# Patient Record
Sex: FEMALE | Race: BLACK OR AFRICAN AMERICAN | Employment: OTHER
[De-identification: names, ages, dates, MRNs, and addresses within clinical notes are randomized per-mention and may not be internally consistent; named-entity substitution may affect disease eponyms.]

---

## 2022-09-09 ENCOUNTER — OFFICE VISIT (OUTPATIENT)
Dept: FAMILY MEDICINE CLINIC | Facility: CLINIC | Age: 67
End: 2022-09-09
Payer: COMMERCIAL

## 2022-09-09 VITALS
BODY MASS INDEX: 15.61 KG/M2 | WEIGHT: 103 LBS | HEIGHT: 68 IN | OXYGEN SATURATION: 97 % | HEART RATE: 66 BPM | DIASTOLIC BLOOD PRESSURE: 85 MMHG | SYSTOLIC BLOOD PRESSURE: 138 MMHG

## 2022-09-09 DIAGNOSIS — I25.10 CORONARY ARTERY DISEASE INVOLVING NATIVE CORONARY ARTERY OF NATIVE HEART WITHOUT ANGINA PECTORIS: Primary | ICD-10-CM

## 2022-09-09 DIAGNOSIS — R73.03 PRE-DIABETES: ICD-10-CM

## 2022-09-09 DIAGNOSIS — Z86.19 HISTORY OF HEPATITIS C: ICD-10-CM

## 2022-09-09 DIAGNOSIS — Z00.00 ENCOUNTER FOR MEDICAL EXAMINATION TO ESTABLISH CARE: ICD-10-CM

## 2022-09-09 DIAGNOSIS — Z95.5 HISTORY OF HEART ARTERY STENT: ICD-10-CM

## 2022-09-09 DIAGNOSIS — E78.5 DYSLIPIDEMIA: ICD-10-CM

## 2022-09-09 LAB
BASOPHILS # BLD: 0 K/UL (ref 0–0.2)
BASOPHILS NFR BLD: 1 % (ref 0–2)
DIFFERENTIAL METHOD BLD: ABNORMAL
EOSINOPHIL # BLD: 0.1 K/UL (ref 0–0.8)
EOSINOPHIL NFR BLD: 2 % (ref 0.5–7.8)
ERYTHROCYTE [DISTWIDTH] IN BLOOD BY AUTOMATED COUNT: 14.6 % (ref 11.9–14.6)
HCT VFR BLD AUTO: 36.8 % (ref 35.8–46.3)
HGB BLD-MCNC: 11.5 G/DL (ref 11.7–15.4)
IMM GRANULOCYTES # BLD AUTO: 0 K/UL (ref 0–0.5)
IMM GRANULOCYTES NFR BLD AUTO: 0 % (ref 0–5)
LYMPHOCYTES # BLD: 2.9 K/UL (ref 0.5–4.6)
LYMPHOCYTES NFR BLD: 62 % (ref 13–44)
MCH RBC QN AUTO: 27.6 PG (ref 26.1–32.9)
MCHC RBC AUTO-ENTMCNC: 31.3 G/DL (ref 31.4–35)
MCV RBC AUTO: 88.5 FL (ref 79.6–97.8)
MONOCYTES # BLD: 0.4 K/UL (ref 0.1–1.3)
MONOCYTES NFR BLD: 9 % (ref 4–12)
NEUTS SEG # BLD: 1.2 K/UL (ref 1.7–8.2)
NEUTS SEG NFR BLD: 26 % (ref 43–78)
NRBC # BLD: 0 K/UL (ref 0–0.2)
PLATELET # BLD AUTO: 243 K/UL (ref 150–450)
PMV BLD AUTO: 10.3 FL (ref 9.4–12.3)
RBC # BLD AUTO: 4.16 M/UL (ref 4.05–5.2)
WBC # BLD AUTO: 4.5 K/UL (ref 4.3–11.1)

## 2022-09-09 PROCEDURE — 1123F ACP DISCUSS/DSCN MKR DOCD: CPT | Performed by: FAMILY MEDICINE

## 2022-09-09 PROCEDURE — 99204 OFFICE O/P NEW MOD 45 MIN: CPT | Performed by: FAMILY MEDICINE

## 2022-09-09 RX ORDER — ATORVASTATIN CALCIUM 80 MG/1
80 TABLET, FILM COATED ORAL
COMMUNITY
Start: 2022-01-31

## 2022-09-09 RX ORDER — LISINOPRIL 2.5 MG/1
2.5 TABLET ORAL DAILY
COMMUNITY
Start: 2022-01-31 | End: 2023-01-26

## 2022-09-09 RX ORDER — CLOPIDOGREL BISULFATE 75 MG/1
75 TABLET ORAL DAILY
COMMUNITY
Start: 2022-01-31 | End: 2023-03-03

## 2022-09-09 RX ORDER — CARVEDILOL 6.25 MG/1
6.25 TABLET ORAL 2 TIMES DAILY WITH MEALS
COMMUNITY
Start: 2022-01-31

## 2022-09-09 ASSESSMENT — PATIENT HEALTH QUESTIONNAIRE - PHQ9
SUM OF ALL RESPONSES TO PHQ9 QUESTIONS 1 & 2: 0
SUM OF ALL RESPONSES TO PHQ QUESTIONS 1-9: 0
SUM OF ALL RESPONSES TO PHQ QUESTIONS 1-9: 0
2. FEELING DOWN, DEPRESSED OR HOPELESS: 0
SUM OF ALL RESPONSES TO PHQ QUESTIONS 1-9: 0
SUM OF ALL RESPONSES TO PHQ QUESTIONS 1-9: 0
1. LITTLE INTEREST OR PLEASURE IN DOING THINGS: 0

## 2022-09-09 ASSESSMENT — ENCOUNTER SYMPTOMS
EYE REDNESS: 0
NAUSEA: 0
SORE THROAT: 0
DIARRHEA: 0
CONSTIPATION: 0
FACIAL SWELLING: 0
SINUS PRESSURE: 0
STRIDOR: 0
BACK PAIN: 0
ABDOMINAL DISTENTION: 0
WHEEZING: 0
VOMITING: 0
COUGH: 0
SINUS PAIN: 0
CHEST TIGHTNESS: 0
COLOR CHANGE: 0
RHINORRHEA: 0
EYE DISCHARGE: 0
SHORTNESS OF BREATH: 0
EYE ITCHING: 0
EYE PAIN: 0
ABDOMINAL PAIN: 0
BLOOD IN STOOL: 0

## 2022-09-09 NOTE — PROGRESS NOTES
[address] [place] [address]  _______________________________________  Anthony Jameson MD                 [address] [place] [address], [address]. [place], [address] [place] [address]                     Jerad Fagan 2                                                                                    Phone: [phone]                                                                                    Fax: [phone]    Pernell Jacobson is a 74 y.o. female who is seen for evaluation of   Chief Complaint   Patient presents with    Establish Care     Pt hasn't had a PCP in several years. She wishes to establish care and have a physical.    Hepatitis C     Pt was found to have hep c about 5 years ago and hasn't had treatment yet because she continues to use marijuana. HPI:   Slim Fletcher is a 75 y/o F with h/o CAD, s/p MI with PCI, chronic hep C, pre-diabetes, dyslipidemia, polysubstance abuse, here to establish care. She has previously only seen [address] [place] [address] for PCP. - CAD- ST elevation MI 12/16/2021. mid LAD with successful PCI and 2 overlapping drug-eluting stents. Follow-up echo the next day showed LV systolic dysfunction with an EF of 35 to 40%. Anterior, anterior septal, and apical akinesis. Recommended dual antiplatelet x 12 months, ASA for life, high intensity statin, and ACE inhibitor and beta-blocker. She was due to see Jazlyn Aguirre MD (cardiology) in 6/2022.     - dyslipidemia- Last LDL in 2021 was 110. Pt is on lipitor 80 mg, but denies any compliance with dietary modifications. - hep C- Pt states that she was diagnosed at [address] [place] [address] a number of years ago, but never saw GI.    - Pre-diabetes- Pt denies any limitations to diet. Review of Systems:  Review of Systems   Constitutional:  Negative for activity change, appetite change, chills, diaphoresis, fatigue, fever and unexpected weight change.    HENT:  Negative for congestion, ear discharge, ear pain, facial swelling, hearing loss, mouth sores, nosebleeds, postnasal drip, rhinorrhea, sinus pressure, sinus pain, sore throat and tinnitus. Eyes:  Negative for pain, discharge, redness, itching and visual disturbance. Respiratory:  Negative for cough, chest tightness, shortness of breath, wheezing and stridor. Cardiovascular:  Negative for chest pain, palpitations and leg swelling. Gastrointestinal:  Negative for abdominal distention, abdominal pain, blood in stool, constipation, diarrhea, nausea and vomiting. Endocrine: Negative for cold intolerance, heat intolerance, polydipsia, polyphagia and polyuria. Genitourinary:  Negative for decreased urine volume, difficulty urinating, dysuria, flank pain, frequency, hematuria and urgency. Musculoskeletal:  Negative for arthralgias, back pain, gait problem, joint swelling, myalgias and neck pain. Skin:  Negative for color change, pallor, rash and wound. Neurological:  Negative for dizziness, tremors, seizures, syncope, facial asymmetry, speech difficulty, weakness, light-headedness, numbness and headaches. Psychiatric/Behavioral:  Negative for agitation, behavioral problems, confusion, hallucinations, self-injury, sleep disturbance and suicidal ideas. The patient is not nervous/anxious.        History:  Past Medical History:   Diagnosis Date    CAD (coronary artery disease)     Hyperlipidemia        Past Surgical History:   Procedure Laterality Date    FACIAL FRACTURE SURGERY  1980       Family History   Problem Relation Age of Onset    Diabetes Mother     Heart Disease Father     Cancer Father        Social History     Tobacco Use    Smoking status: Every Day     Packs/day: 0.50     Types: Cigarettes     Start date: 1970    Smokeless tobacco: Never   Substance Use Topics    Alcohol use: Yes     Comment: socially       No Known Allergies    Current Outpatient Medications   Medication Sig Dispense Refill    atorvastatin (LIPITOR) 80 MG tablet Take 80 mg by mouth      carvedilol (COREG) 6.25 MG tablet Take 6.25 mg by mouth 2 times daily (with meals)      clopidogrel (PLAVIX) 75 MG tablet Take 75 mg by mouth daily      lisinopril (PRINIVIL;ZESTRIL) 2.5 MG tablet Take 2.5 mg by mouth daily       No current facility-administered medications for this visit. Vitals:    /85 (Site: Left Upper Arm, Position: Sitting, Cuff Size: Small Adult)   Pulse 66   Ht 5' 8\" (1.727 m)   Wt 103 lb (46.7 kg)   SpO2 97%   BMI 15.66 kg/m²     Contains abnormal data Lipid Panel  Order: [id number]   Ref Range & Units 12/17/21 0535   Triglycerides <150 mg/dL 95    Cholesterol <200 mg/dL 204 High     HDL Cholesterol >59 mg/dL 75    LDL, Calculated <130 mg/dL 110    Comment: Desirable: < 130 mg/dL; Borderline 130 - 159 mg/dL   VLDL Cholesterol <40 mg/dL 19    Chol/HDL Ratio <3.5 2.7    Non-HDL Cholesterol <130 mg/dL 129      Basic Metabolic Panel  Order: [id number]   suggestion  Information displayed in this report may not trend or trigger automated decision support. Ref Range & Units 8 mo ago   Sodium 136 - 145 mmol/L 129 Low     Potassium 3.5 - 5.1 mmol/L 4.5    Chloride 98 - 107 mmol/L 96 Low     CO2 20 - 30 mmol/L 24    BUN 7 - 20 mg/dL 14    Calcium 8.5 - 10.4 mg/dL 8.7    Creatinine 0.57 - 1.11 mg/dL 0.82    Glucose 70 - 99 mg/dL 156 High     Anion Gap 6 - 16 mmol/L 9    eGFR >59 mL/min/1.73 m2 76    eGFR African American >59 mL/min/1.73 m2 87      Contains abnormal data Hgb A1C (Glycohemoglobin)  Order: [id number]   Ref Range & Units 12/17/21 0535   Hemoglobin A1C <5.7 % 6.2 High     Mean Bld Glu Estim.  Refer to Glucose mg/dL 131    [id number] [facility]   Narrative  Performed by [address] [place] [address]  5.7 - 6.4% At Risk for Diabetes Mellitus     Contains abnormal data Hepatitis Acute Panel w/Rflx  Order: [id number]   Ref Range & Units 12/16/21 0749   Hep B Surf Antigen [facility] Non-Reactive    Comment: Considered negative for HBsAg   Hep A Antibody, IgM Non-Reactive Non-Reactive    Comment: IgM anti-HAV not detected. Does not exclude the possibility of exposure to or infection with HAV. Levels of IgM anti-HAV may be below the cut-off in early infection. Hep B Core Ab, IgM Non-Reactive Non-Reactive    Comment: IgM anti-HBc not detected. Does not exclude the possibility of exposure to or infections with HBV. Hep C Antibody Non-Reactive REACTIVE, see results of PCR test Abnormal     Comment: Presumptive evidence of antibodies to HCV. Confirmatory testing by PCR to be performed. [id number] [facility]   Specimen Collected: 12/16/21 07:49 Last Resulted: 12/16/21 11:03   Received From: [facility]   Result Received: 09/09/22 10:49     Hepatitis C Genotype  Order: [id number]  Component 12/16/21 0737   Hep C Genotype  1a      Physical Exam:  Physical Exam  Vitals reviewed. Constitutional:       General: She is not in acute distress. Appearance: Normal appearance. She is not ill-appearing, toxic-appearing or diaphoretic. HENT:      Head: Normocephalic and atraumatic. Right Ear: Tympanic membrane, ear canal and external ear normal. There is no impacted cerumen. Left Ear: Tympanic membrane, ear canal and external ear normal. There is no impacted cerumen. Nose: Nose normal. No congestion or rhinorrhea. Mouth/Throat:      Mouth: Mucous membranes are moist.      Pharynx: No oropharyngeal exudate or posterior oropharyngeal erythema. Eyes:      General: No scleral icterus. Right eye: No discharge. Left eye: No discharge. Extraocular Movements: Extraocular movements intact. Conjunctiva/sclera: Conjunctivae normal.      Pupils: Pupils are equal, round, and reactive to light. Cardiovascular:      Rate and Rhythm: Normal rate and regular rhythm. Pulses: Normal pulses. Heart sounds: Normal heart sounds. No murmur heard. No friction rub.  No gallop. Pulmonary:      Effort: Pulmonary effort is normal. No respiratory distress. Breath sounds: Normal breath sounds. No stridor. No wheezing, rhonchi or rales. Chest:      Chest wall: No tenderness. Abdominal:      General: Abdomen is flat. Bowel sounds are normal. There is no distension. Palpations: Abdomen is soft. There is no mass. Tenderness: There is no abdominal tenderness. There is no right CVA tenderness, left CVA tenderness, guarding or rebound. Musculoskeletal:         General: No swelling, tenderness, deformity or signs of injury. Cervical back: Neck supple. No rigidity or tenderness. Right lower leg: No edema. Left lower leg: No edema. Lymphadenopathy:      Cervical: No cervical adenopathy. Skin:     General: Skin is warm and dry. Coloration: Skin is not jaundiced or pale. Findings: No bruising, erythema, lesion or rash. Neurological:      General: No focal deficit present. Mental Status: She is alert. Mental status is at baseline. Motor: No weakness. Coordination: Coordination normal.      Gait: Gait normal.   Psychiatric:         Mood and Affect: Mood normal.         Behavior: Behavior normal.         Thought Content: Thought content normal.         Judgment: Judgment normal.       Assessment/Plan:     ICD-10-CM    1. Coronary artery disease involving native coronary artery of native heart without angina pectoris  I25.10 CBC with Auto Differential     Basic Metabolic Panel     Basic Metabolic Panel     CBC with Auto Differential      2. Dyslipidemia  E78.5 Lipid Panel     Lipid Panel      3. History of heart artery stent  Z95.5 Lipid Panel     Lipid Panel      4. Pre-diabetes  R73.03 Hemoglobin A1C     Hemoglobin A1C      5. History of hepatitis C  Z86.19 Hepatic Function Panel     Hepatic Function Panel      6.  Encounter for medical examination to establish care  Z00.00 TSH     TSH           Problem List          Circulatory Coronary artery disease involving native coronary artery of native heart without angina pectoris - Primary      Stable. Continue statin, BB, plavix, ACEi. Encouraged abstinence for illicit substances. Relevant Medications    atorvastatin (LIPITOR) 80 MG tablet    carvedilol (COREG) 6.25 MG tablet    lisinopril (PRINIVIL;ZESTRIL) 2.5 MG tablet    Other Relevant Orders    CBC with Auto Differential    Basic Metabolic Panel       Digestive    History of hepatitis C    Relevant Orders    Hepatic Function Panel       Other    Dyslipidemia     LDL goal < 70. Continue statin. Encouraged to reduce red meat, fried foods, fast foods, butter, mayonnaise and dairy products; increase daily exercise and limit intake of egg yolks to < 7 egg yolks weekly. Relevant Orders    Lipid Panel    History of heart artery stent      Medical optimization. Will follow with cards. Relevant Orders    Lipid Panel    Pre-diabetes     F/u a1c. Encouraged to control intake of sugar, bread, pasta, rice, potatoes, fruit, snack foods, desserts in diet. Relevant Orders    Hemoglobin A1C      I spent 47 minutes preparing to see patient (including chart review and preparation), obtaining and/or reviewing additional medical history, performing a physical exam and evaluation, documenting clinical information in the electronic health record, independently interpreting results, communicating results to patient, family or caregiver, and/or coordinating care.     Louis Rodriguez MD

## 2022-09-09 NOTE — ASSESSMENT & PLAN NOTE
LDL goal < 70. Continue statin. Encouraged to reduce red meat, fried foods, fast foods, butter, mayonnaise and dairy products; increase daily exercise and limit intake of egg yolks to < 7 egg yolks weekly.

## 2022-09-09 NOTE — ASSESSMENT & PLAN NOTE
F/u a1c. Encouraged to control intake of sugar, bread, pasta, rice, potatoes, fruit, snack foods, desserts in diet.

## 2022-09-10 LAB
ALBUMIN SERPL-MCNC: 3.5 G/DL (ref 3.2–4.6)
ALBUMIN/GLOB SERPL: 1 {RATIO} (ref 1.2–3.5)
ALP SERPL-CCNC: 137 U/L (ref 50–136)
ALT SERPL-CCNC: 33 U/L (ref 12–65)
ANION GAP SERPL CALC-SCNC: 8 MMOL/L (ref 4–13)
AST SERPL-CCNC: 30 U/L (ref 15–37)
BILIRUB DIRECT SERPL-MCNC: 0.1 MG/DL
BILIRUB SERPL-MCNC: 0.3 MG/DL (ref 0.2–1.1)
BUN SERPL-MCNC: 14 MG/DL (ref 8–23)
CALCIUM SERPL-MCNC: 8.9 MG/DL (ref 8.3–10.4)
CHLORIDE SERPL-SCNC: 113 MMOL/L (ref 101–110)
CHOLEST SERPL-MCNC: 122 MG/DL
CO2 SERPL-SCNC: 25 MMOL/L (ref 21–32)
CREAT SERPL-MCNC: 0.9 MG/DL (ref 0.6–1)
EST. AVERAGE GLUCOSE BLD GHB EST-MCNC: 146 MG/DL
GLOBULIN SER CALC-MCNC: 3.5 G/DL (ref 2.3–3.5)
GLUCOSE SERPL-MCNC: 140 MG/DL (ref 65–100)
HBA1C MFR BLD: 6.7 % (ref 4.8–5.6)
HDLC SERPL-MCNC: 62 MG/DL (ref 40–60)
HDLC SERPL: 2 {RATIO}
LDLC SERPL CALC-MCNC: 39.6 MG/DL
POTASSIUM SERPL-SCNC: 4.2 MMOL/L (ref 3.5–5.1)
PROT SERPL-MCNC: 7 G/DL (ref 6.3–8.2)
SODIUM SERPL-SCNC: 146 MMOL/L (ref 136–145)
TRIGL SERPL-MCNC: 102 MG/DL (ref 35–150)
TSH, 3RD GENERATION: 0.95 UIU/ML (ref 0.36–3.74)
VLDLC SERPL CALC-MCNC: 20.4 MG/DL (ref 6–23)

## 2022-09-27 ENCOUNTER — OFFICE VISIT (OUTPATIENT)
Dept: FAMILY MEDICINE CLINIC | Facility: CLINIC | Age: 67
End: 2022-09-27
Payer: COMMERCIAL

## 2022-09-27 VITALS
WEIGHT: 101 LBS | BODY MASS INDEX: 15.31 KG/M2 | HEART RATE: 70 BPM | DIASTOLIC BLOOD PRESSURE: 70 MMHG | OXYGEN SATURATION: 98 % | HEIGHT: 68 IN | SYSTOLIC BLOOD PRESSURE: 142 MMHG

## 2022-09-27 DIAGNOSIS — R05.8 PRODUCTIVE COUGH: ICD-10-CM

## 2022-09-27 DIAGNOSIS — Z12.31 ENCOUNTER FOR SCREENING MAMMOGRAM FOR MALIGNANT NEOPLASM OF BREAST: ICD-10-CM

## 2022-09-27 DIAGNOSIS — Z78.0 MENOPAUSE: ICD-10-CM

## 2022-09-27 DIAGNOSIS — J01.90 ACUTE BACTERIAL SINUSITIS: ICD-10-CM

## 2022-09-27 DIAGNOSIS — D64.9 NORMOCYTIC ANEMIA: ICD-10-CM

## 2022-09-27 DIAGNOSIS — R03.0 ELEVATED BLOOD PRESSURE READING: ICD-10-CM

## 2022-09-27 DIAGNOSIS — R73.03 PRE-DIABETES: ICD-10-CM

## 2022-09-27 DIAGNOSIS — Z28.39 BEHIND ON IMMUNIZATIONS: ICD-10-CM

## 2022-09-27 DIAGNOSIS — R73.09 ELEVATED HEMOGLOBIN A1C: ICD-10-CM

## 2022-09-27 DIAGNOSIS — Z00.00 WELCOME TO MEDICARE PREVENTIVE VISIT: Primary | ICD-10-CM

## 2022-09-27 DIAGNOSIS — F17.200 SMOKER: ICD-10-CM

## 2022-09-27 DIAGNOSIS — B96.89 ACUTE BACTERIAL SINUSITIS: ICD-10-CM

## 2022-09-27 DIAGNOSIS — Z12.11 SCREENING FOR COLON CANCER: ICD-10-CM

## 2022-09-27 PROCEDURE — 99214 OFFICE O/P EST MOD 30 MIN: CPT | Performed by: FAMILY MEDICINE

## 2022-09-27 PROCEDURE — G0402 INITIAL PREVENTIVE EXAM: HCPCS | Performed by: FAMILY MEDICINE

## 2022-09-27 PROCEDURE — 1123F ACP DISCUSS/DSCN MKR DOCD: CPT | Performed by: FAMILY MEDICINE

## 2022-09-27 RX ORDER — ZOSTER VACCINE RECOMBINANT, ADJUVANTED 50 MCG/0.5
0.5 KIT INTRAMUSCULAR SEE ADMIN INSTRUCTIONS
Qty: 0.5 ML | Refills: 0 | Status: SHIPPED | OUTPATIENT
Start: 2022-09-27 | End: 2022-10-25

## 2022-09-27 RX ORDER — AMOXICILLIN 875 MG/1
875 TABLET, COATED ORAL 2 TIMES DAILY
Qty: 14 TABLET | Refills: 0 | Status: SHIPPED | OUTPATIENT
Start: 2022-09-27 | End: 2022-10-04

## 2022-09-27 ASSESSMENT — LIFESTYLE VARIABLES
HOW OFTEN DO YOU HAVE A DRINK CONTAINING ALCOHOL: 2-4 TIMES A MONTH
HOW MANY STANDARD DRINKS CONTAINING ALCOHOL DO YOU HAVE ON A TYPICAL DAY: 1 OR 2
HOW MANY STANDARD DRINKS CONTAINING ALCOHOL DO YOU HAVE ON A TYPICAL DAY: 1 OR 2
HOW OFTEN DO YOU HAVE A DRINK CONTAINING ALCOHOL: 2-4 TIMES A MONTH

## 2022-09-27 ASSESSMENT — PATIENT HEALTH QUESTIONNAIRE - PHQ9
2. FEELING DOWN, DEPRESSED OR HOPELESS: 0
SUM OF ALL RESPONSES TO PHQ QUESTIONS 1-9: 0
1. LITTLE INTEREST OR PLEASURE IN DOING THINGS: 0
SUM OF ALL RESPONSES TO PHQ QUESTIONS 1-9: 0
SUM OF ALL RESPONSES TO PHQ QUESTIONS 1-9: 0
SUM OF ALL RESPONSES TO PHQ9 QUESTIONS 1 & 2: 0
SUM OF ALL RESPONSES TO PHQ QUESTIONS 1-9: 0

## 2022-09-27 NOTE — PROGRESS NOTES
[address] [place] [address]  _______________________________________  Simeon Nails MD                 [address] [place] [address],  [address]. [place], [address] [place] [address]                     Jerad Ortega 2                                                                                    Phone: [phone]                                                                                    Fax: [phone]    Ivan Landin is a 74 y.o. female who is seen for evaluation of No chief complaint on file. HPI:   PT is seen today for welcome to medicare. - c/o > 10 day h/o sinus pressure, purulent rhinorrhea, mild cough with yellow sputum at times. Denies fever chills, sob. Review of Systems:  Review of Systems   Constitutional:  Negative for activity change, appetite change, chills, diaphoresis, fatigue, fever and unexpected weight change. HENT:  Negative for congestion, ear discharge, ear pain, facial swelling, hearing loss, mouth sores, nosebleeds, postnasal drip, rhinorrhea, sinus pressure, sinus pain, sore throat and tinnitus. Eyes:  Negative for pain, discharge, redness, itching and visual disturbance. Respiratory:  Negative for cough, chest tightness, shortness of breath, wheezing and stridor. Cardiovascular:  Negative for chest pain, palpitations and leg swelling. Gastrointestinal:  Negative for abdominal distention, abdominal pain, blood in stool, constipation, diarrhea, nausea and vomiting. Endocrine: Negative for cold intolerance, heat intolerance, polydipsia, polyphagia and polyuria. Genitourinary:  Negative for decreased urine volume, difficulty urinating, dysuria, flank pain, frequency, hematuria and urgency. Musculoskeletal:  Negative for arthralgias, back pain, gait problem, joint swelling, myalgias and neck pain. Skin:  Negative for color change, pallor, rash and wound.    Neurological:  Negative for dizziness, tremors, seizures, syncope, facial asymmetry, speech difficulty, weakness, light-headedness, numbness and headaches. Psychiatric/Behavioral:  Negative for agitation, behavioral problems, confusion, hallucinations, self-injury, sleep disturbance and suicidal ideas. The patient is not nervous/anxious. History:  Past Medical History:   Diagnosis Date    CAD (coronary artery disease)     Hyperlipidemia        Past Surgical History:   Procedure Laterality Date    FACIAL FRACTURE SURGERY  1980       Family History   Problem Relation Age of Onset    Diabetes Mother     Heart Disease Father     Cancer Father        Social History     Tobacco Use    Smoking status: Every Day     Packs/day: 0.25     Years: 52.00     Pack years: 13.00     Types: Cigarettes     Start date: 1970    Smokeless tobacco: Never   Substance Use Topics    Alcohol use: Yes     Comment: socially       No Known Allergies    Current Outpatient Medications   Medication Sig Dispense Refill    pneumococcal 20-valent conjugat (PREVNAR) 0.5 ML ZAIN inj Inject 0.5 mLs into the muscle once for 1 dose 0.5 mL 0    zoster recombinant adjuvanted vaccine (SHINGRIX) 50 MCG/0.5ML SUSR injection Inject 0.5 mLs into the muscle See Admin Instructions 1 dose now and repeat in 2-6 months 0.5 mL 0    Tetanus-Diphth-Acell Pertussis (BOOSTRIX) 5-2.5-18.5 LF-MCG/0.5 injection Inject 0.5 mLs into the muscle once for 1 dose 1 each 0    atorvastatin (LIPITOR) 80 MG tablet Take 80 mg by mouth      carvedilol (COREG) 6.25 MG tablet Take 6.25 mg by mouth 2 times daily (with meals)      clopidogrel (PLAVIX) 75 MG tablet Take 75 mg by mouth daily      lisinopril (PRINIVIL;ZESTRIL) 2.5 MG tablet Take 2.5 mg by mouth daily       No current facility-administered medications for this visit.        Vitals:    BP (!) 170/90 (Site: Left Upper Arm, Position: Sitting, Cuff Size: Small Adult)   Pulse 70   Ht 5' 8\" (1.727 m)   Wt 101 lb (45.8 kg)   SpO2 98%   BMI 15.36 kg/m²     Lab Results   Component Value Date    WBC 4.5 09/09/2022    HGB 11.5 (L) 09/09/2022    HCT 36.8 09/09/2022    MCV 88.5 09/09/2022     09/09/2022     Lab Results   Component Value Date/Time     09/09/2022 11:39 AM    K 4.2 09/09/2022 11:39 AM     09/09/2022 11:39 AM    CO2 25 09/09/2022 11:39 AM    BUN 14 09/09/2022 11:39 AM    CREATININE 0.90 09/09/2022 11:39 AM    GLUCOSE 140 09/09/2022 11:39 AM    CALCIUM 8.9 09/09/2022 11:39 AM      Lab Results   Component Value Date    ALT 33 09/09/2022    AST 30 09/09/2022    ALKPHOS 137 (H) 09/09/2022    BILITOT 0.3 09/09/2022     Lab Results   Component Value Date    KEY7UJT 0.955 09/09/2022     Lab Results   Component Value Date    CHOL 122 09/09/2022     Lab Results   Component Value Date    TRIG 102 09/09/2022     Lab Results   Component Value Date    HDL 62 (H) 09/09/2022     Lab Results   Component Value Date    LDLCALC 39.6 09/09/2022     Lab Results   Component Value Date    LABVLDL 20.4 09/09/2022     Lab Results   Component Value Date    CHOLHDLRATIO 2.0 09/09/2022     Lab Results   Component Value Date    LABA1C 6.7 (H) 09/09/2022     Lab Results   Component Value Date     09/09/2022         Physical Exam:  Physical Exam  Vitals reviewed. Constitutional:       General: She is not in acute distress. Appearance: Normal appearance. She is not ill-appearing, toxic-appearing or diaphoretic. HENT:      Head: Normocephalic and atraumatic. Right Ear: Tympanic membrane, ear canal and external ear normal. There is no impacted cerumen. Left Ear: Tympanic membrane, ear canal and external ear normal. There is no impacted cerumen. Nose: Nose normal. No congestion or rhinorrhea. Mouth/Throat:      Mouth: Mucous membranes are moist.      Pharynx: No oropharyngeal exudate or posterior oropharyngeal erythema. Eyes:      General: No scleral icterus. Right eye: No discharge. Left eye: No discharge. Extraocular Movements: Extraocular movements intact.       Conjunctiva/sclera: Conjunctivae normal.      Pupils: Pupils are equal, round, and reactive to light. Cardiovascular:      Rate and Rhythm: Normal rate and regular rhythm. Pulses: Normal pulses. Heart sounds: Normal heart sounds. No murmur heard. No friction rub. No gallop. Pulmonary:      Effort: Pulmonary effort is normal. No respiratory distress. Breath sounds: Normal breath sounds. No stridor. No wheezing, rhonchi or rales. Chest:      Chest wall: No tenderness. Abdominal:      General: Abdomen is flat. Bowel sounds are normal. There is no distension. Palpations: Abdomen is soft. There is no mass. Tenderness: There is no abdominal tenderness. There is no right CVA tenderness, left CVA tenderness, guarding or rebound. Musculoskeletal:         General: No swelling, tenderness, deformity or signs of injury. Cervical back: Neck supple. No rigidity or tenderness. Right lower leg: No edema. Left lower leg: No edema. Lymphadenopathy:      Cervical: No cervical adenopathy. Skin:     General: Skin is warm and dry. Coloration: Skin is not jaundiced or pale. Findings: No bruising, erythema, lesion or rash. Neurological:      General: No focal deficit present. Mental Status: She is alert. Mental status is at baseline. Motor: No weakness. Coordination: Coordination normal.      Gait: Gait normal.   Psychiatric:         Mood and Affect: Mood normal.         Behavior: Behavior normal.         Thought Content: Thought content normal.         Judgment: Judgment normal.       Assessment/Plan:     ICD-10-CM    1. Welcome to Medicare preventive visit  Z00.00       2. Smoker  F17.200 CT CHEST DIAGNOSTIC LOW DOSE      3. Screening for colon cancer  Z12.11 [facility]      4. Encounter for screening mammogram for malignant neoplasm of breast  Z12.31 [facility] Screening Bilateral      5.  Menopause  Z78.0 DEXA BONE DENSITY AXIAL SKELETON 6. Behind on immunizations  Z28.39 pneumococcal 20-valent conjugat (PREVNAR) 0.5 ML ZAIN inj     zoster recombinant adjuvanted vaccine (SHINGRIX) 50 MCG/0.5ML SUSR injection     Tetanus-Diphth-Acell Pertussis (BOOSTRIX) 5-2.5-18.5 LF-MCG/0.5 injection      7.  Normocytic anemia  D64.9 Iron     Ferritin     Total Iron Binding Capacity           Problem List          Other    Screening for colon cancer    Relevant Orders    [facility]    Welcome to [facility] preventive visit - Primary    Encounter for screening mammogram for malignant neoplasm of breast    Relevant Orders    [facility] Screening Bilateral    Menopause    Relevant Orders    DEXA BONE DENSITY AXIAL SKELETON    Smoker    Relevant Orders    CT CHEST DIAGNOSTIC LOW DOSE        Marino Sommer MD

## 2022-09-29 ASSESSMENT — ENCOUNTER SYMPTOMS
EYE REDNESS: 0
BACK PAIN: 0
ABDOMINAL PAIN: 0
NAUSEA: 0
COLOR CHANGE: 0
COUGH: 0
STRIDOR: 0
CONSTIPATION: 0
EYE ITCHING: 0
CHEST TIGHTNESS: 0
DIARRHEA: 0
SHORTNESS OF BREATH: 0
EYE PAIN: 0
EYE DISCHARGE: 0
ABDOMINAL DISTENTION: 0
SINUS PRESSURE: 0
VOMITING: 0
SINUS PAIN: 0
SORE THROAT: 0
RHINORRHEA: 0
FACIAL SWELLING: 0
BLOOD IN STOOL: 0
WHEEZING: 0

## 2022-09-30 NOTE — ASSESSMENT & PLAN NOTE
Encouraged to control intake of sugar, bread, pasta, rice, potatoes, fruit, snack foods, desserts in diet.

## 2022-09-30 NOTE — ASSESSMENT & PLAN NOTE
Given duration of s/s, will start abx tx. Explained importance of taking abx as prescribed and taking entire rx, increase hydration to at least 1-2 L of fluid daily. Recommend starting flonase 1 spray each nostril daily while on abx, over the counter antihistamine and saline nasal spray as needed for nasal dryness.

## 2022-10-25 ENCOUNTER — OFFICE VISIT (OUTPATIENT)
Dept: FAMILY MEDICINE CLINIC | Facility: CLINIC | Age: 67
End: 2022-10-25
Payer: MEDICARE

## 2022-10-25 VITALS
BODY MASS INDEX: 15.1 KG/M2 | HEIGHT: 68 IN | HEART RATE: 88 BPM | SYSTOLIC BLOOD PRESSURE: 135 MMHG | OXYGEN SATURATION: 100 % | WEIGHT: 99.6 LBS | DIASTOLIC BLOOD PRESSURE: 80 MMHG

## 2022-10-25 DIAGNOSIS — I50.22 CHRONIC SYSTOLIC CONGESTIVE HEART FAILURE (HCC): ICD-10-CM

## 2022-10-25 DIAGNOSIS — E87.0 HYPERNATREMIA: ICD-10-CM

## 2022-10-25 DIAGNOSIS — I25.10 CORONARY ARTERY DISEASE INVOLVING NATIVE CORONARY ARTERY OF NATIVE HEART WITHOUT ANGINA PECTORIS: ICD-10-CM

## 2022-10-25 DIAGNOSIS — D64.9 NORMOCYTIC ANEMIA: Primary | ICD-10-CM

## 2022-10-25 DIAGNOSIS — E11.65 TYPE 2 DIABETES MELLITUS WITH HYPERGLYCEMIA, WITHOUT LONG-TERM CURRENT USE OF INSULIN (HCC): ICD-10-CM

## 2022-10-25 DIAGNOSIS — R05.8 PRODUCTIVE COUGH: ICD-10-CM

## 2022-10-25 PROCEDURE — 1123F ACP DISCUSS/DSCN MKR DOCD: CPT | Performed by: FAMILY MEDICINE

## 2022-10-25 PROCEDURE — G8427 DOCREV CUR MEDS BY ELIG CLIN: HCPCS | Performed by: FAMILY MEDICINE

## 2022-10-25 PROCEDURE — 3044F HG A1C LEVEL LT 7.0%: CPT | Performed by: FAMILY MEDICINE

## 2022-10-25 PROCEDURE — 1090F PRES/ABSN URINE INCON ASSESS: CPT | Performed by: FAMILY MEDICINE

## 2022-10-25 PROCEDURE — G8400 PT W/DXA NO RESULTS DOC: HCPCS | Performed by: FAMILY MEDICINE

## 2022-10-25 PROCEDURE — G8419 CALC BMI OUT NRM PARAM NOF/U: HCPCS | Performed by: FAMILY MEDICINE

## 2022-10-25 PROCEDURE — 99214 OFFICE O/P EST MOD 30 MIN: CPT | Performed by: FAMILY MEDICINE

## 2022-10-25 PROCEDURE — G8484 FLU IMMUNIZE NO ADMIN: HCPCS | Performed by: FAMILY MEDICINE

## 2022-10-25 PROCEDURE — 4004F PT TOBACCO SCREEN RCVD TLK: CPT | Performed by: FAMILY MEDICINE

## 2022-10-25 PROCEDURE — 2022F DILAT RTA XM EVC RTNOPTHY: CPT | Performed by: FAMILY MEDICINE

## 2022-10-25 PROCEDURE — 3017F COLORECTAL CA SCREEN DOC REV: CPT | Performed by: FAMILY MEDICINE

## 2022-10-25 ASSESSMENT — ENCOUNTER SYMPTOMS
BACK PAIN: 0
RHINORRHEA: 0
STRIDOR: 0
WHEEZING: 0
FACIAL SWELLING: 0
EYE REDNESS: 0
EYE ITCHING: 0
EYE DISCHARGE: 0
DIARRHEA: 0
ABDOMINAL DISTENTION: 0
BLOOD IN STOOL: 0
CONSTIPATION: 0
VOMITING: 0
COLOR CHANGE: 0
SORE THROAT: 0
SINUS PAIN: 0
COUGH: 0
SHORTNESS OF BREATH: 0
ABDOMINAL PAIN: 0
EYE PAIN: 0
NAUSEA: 0
SINUS PRESSURE: 0
CHEST TIGHTNESS: 0

## 2022-10-25 NOTE — PROGRESS NOTES
[address] [place] [address]  _______________________________________  Gigi Gonzales MD                 [address] [place] [address],  [address]. [place], [address] [place] [address]                     Jerad Fagan 2                                                                                    Phone: [phone]                                                                                    Fax: [phone]    Yue Manley is a 74 y.o. female who is seen for evaluation of   Chief Complaint   Patient presents with    Anemia     Pt has not yet had blood work for iron panel. Diabetes       HPI:   Roddy Carreno is a 73 y/o F with h/o CAD, s/p MI with PCI, chronic hep C, pre-diabetes, dyslipidemia, polysubstance abuse, here for f//u. She has previously only seen [address] [place] [address] for PCP. - CAD- ST elevation MI 12/16/2021. mid LAD with successful PCI and 2 overlapping drug-eluting stents. Follow-up echo the next day showed LV systolic dysfunction with an EF of 35 to 40%. Anterior, anterior septal, and apical akinesis. Recommended dual antiplatelet x 12 months, ASA for life, high intensity statin, and ACE inhibitor and beta-blocker. She was due to see James Tomlinson MD (cardiology) in 6/2022.     - dyslipidemia- Last LDL in 2021 was 110. Pt is on lipitor 80 mg, but denies any compliance with dietary modifications. - hep C- Pt states that she was diagnosed at [facility] a number of years ago, but never saw GI.    - Pre-diabetes- Pt denies any limitations to diet. Her A1c was 6.7 on last check. She is on metformin 500mg BID.     - Sodium 146 on last visit. Pt indicates that she does not drink much water. - Her hgb was 11.5 on last visit. She denies any bleeding episodes. There is no available results from her last c-scope, but pt denies any BRBPR. Review of Systems:  Review of Systems   Constitutional:  Negative for activity change, appetite change, chills, diaphoresis, fatigue, fever and unexpected weight change. HENT:  Negative for congestion, ear discharge, ear pain, facial swelling, hearing loss, mouth sores, nosebleeds, postnasal drip, rhinorrhea, sinus pressure, sinus pain, sore throat and tinnitus. Eyes:  Negative for pain, discharge, redness, itching and visual disturbance. Respiratory:  Negative for cough, chest tightness, shortness of breath, wheezing and stridor. Cardiovascular:  Negative for chest pain, palpitations and leg swelling. Gastrointestinal:  Negative for abdominal distention, abdominal pain, blood in stool, constipation, diarrhea, nausea and vomiting. Endocrine: Negative for cold intolerance, heat intolerance, polydipsia, polyphagia and polyuria. Genitourinary:  Negative for decreased urine volume, difficulty urinating, dysuria, flank pain, frequency, hematuria and urgency. Musculoskeletal:  Negative for arthralgias, back pain, gait problem, joint swelling, myalgias and neck pain. Skin:  Negative for color change, pallor, rash and wound. Neurological:  Negative for dizziness, tremors, seizures, syncope, facial asymmetry, speech difficulty, weakness, light-headedness, numbness and headaches. Psychiatric/Behavioral:  Negative for agitation, behavioral problems, confusion, hallucinations, self-injury, sleep disturbance and suicidal ideas. The patient is not nervous/anxious.        History:  Past Medical History:   Diagnosis Date    CAD (coronary artery disease)     Hyperlipidemia        Past Surgical History:   Procedure Laterality Date    FACIAL FRACTURE SURGERY  1980       Family History   Problem Relation Age of Onset    Diabetes Mother     Heart Disease Father     Cancer Father        Social History     Tobacco Use    Smoking status: Every Day     Packs/day: 0.25     Years: 52.00     Pack years: 13.00     Types: Cigarettes     Start date: 5    Smokeless tobacco: Never   Substance Use Topics    Alcohol use: Yes     Comment: socially       No Known Allergies    Current Outpatient Medications   Medication Sig Dispense Refill    metFORMIN (GLUCOPHAGE) 500 MG tablet Take 1 tablet by mouth 2 times daily (with meals) 180 tablet 3    atorvastatin (LIPITOR) 80 MG tablet Take 80 mg by mouth      carvedilol (COREG) 6.25 MG tablet Take 6.25 mg by mouth 2 times daily (with meals)      clopidogrel (PLAVIX) 75 MG tablet Take 75 mg by mouth daily      lisinopril (PRINIVIL;ZESTRIL) 2.5 MG tablet Take 2.5 mg by mouth daily       No current facility-administered medications for this visit. Vitals:    /80 (Site: Left Upper Arm, Position: Sitting, Cuff Size: Small Adult)   Pulse 88   Ht 5' 8\" (1.727 m)   Wt 99 lb 9.6 oz (45.2 kg)   SpO2 100%   BMI 15.14 kg/m²     Lab Results   Component Value Date    WBC 4.5 09/09/2022    HGB 11.5 (L) 09/09/2022    HCT 36.8 09/09/2022    MCV 88.5 09/09/2022     09/09/2022     Lab Results   Component Value Date/Time     09/09/2022 11:39 AM    K 4.2 09/09/2022 11:39 AM     09/09/2022 11:39 AM    CO2 25 09/09/2022 11:39 AM    BUN 14 09/09/2022 11:39 AM    CREATININE 0.90 09/09/2022 11:39 AM    GLUCOSE 140 09/09/2022 11:39 AM    CALCIUM 8.9 09/09/2022 11:39 AM      Hemoglobin A1C   Date Value Ref Range Status   09/09/2022 6.7 (H) 4.8 - 5.6 % Final         Physical Exam:  Physical Exam  Vitals reviewed. Constitutional:       General: She is not in acute distress. Appearance: Normal appearance. She is not ill-appearing, toxic-appearing or diaphoretic. HENT:      Head: Normocephalic and atraumatic. Right Ear: Tympanic membrane, ear canal and external ear normal. There is no impacted cerumen. Left Ear: Tympanic membrane, ear canal and external ear normal. There is no impacted cerumen. Nose: Nose normal. No congestion or rhinorrhea. Mouth/Throat:      Mouth: Mucous membranes are moist.      Pharynx: No oropharyngeal exudate or posterior oropharyngeal erythema. Eyes:      General: No scleral icterus. Right eye: No discharge. Left eye: No discharge. Extraocular Movements: Extraocular movements intact. Conjunctiva/sclera: Conjunctivae normal.      Pupils: Pupils are equal, round, and reactive to light. Cardiovascular:      Rate and Rhythm: Normal rate and regular rhythm. Pulses: Normal pulses. Heart sounds: Normal heart sounds. No murmur heard. No friction rub. No gallop. Pulmonary:      Effort: Pulmonary effort is normal. No respiratory distress. Breath sounds: Normal breath sounds. No stridor. No wheezing, rhonchi or rales. Chest:      Chest wall: No tenderness. Abdominal:      General: Abdomen is flat. Bowel sounds are normal. There is no distension. Palpations: Abdomen is soft. There is no mass. Tenderness: There is no abdominal tenderness. There is no right CVA tenderness, left CVA tenderness, guarding or rebound. Musculoskeletal:         General: No swelling, tenderness, deformity or signs of injury. Cervical back: Neck supple. No rigidity or tenderness. Right lower leg: No edema. Left lower leg: No edema. Lymphadenopathy:      Cervical: No cervical adenopathy. Skin:     General: Skin is warm and dry. Coloration: Skin is not jaundiced or pale. Findings: No bruising, erythema, lesion or rash. Neurological:      General: No focal deficit present. Mental Status: She is alert. Mental status is at baseline. Motor: No weakness. Coordination: Coordination normal.      Gait: Gait normal.   Psychiatric:         Mood and Affect: Mood normal.         Behavior: Behavior normal.         Thought Content: Thought content normal.         Judgment: Judgment normal.       Assessment/Plan:     ICD-10-CM    1. Normocytic anemia  D64.9       2. Coronary artery disease involving native coronary artery of native heart without angina pectoris  I25.10       3.  Type 2 diabetes mellitus with hyperglycemia, without long-term current use of insulin ([facility])  E11.65       4. Hypernatremia  T86.0 Basic Metabolic Panel      5. Productive cough  R05.8       6. Chronic systolic congestive heart failure (HCC)  I50.22            Problem List          Circulatory    Coronary artery disease involving native coronary artery of native heart without angina pectoris      Stable. No s/s. Continue medical management- statin, BB, plavix. Relevant Medications    atorvastatin (LIPITOR) 80 MG tablet    carvedilol (COREG) 6.25 MG tablet    lisinopril (PRINIVIL;ZESTRIL) 2.5 MG tablet    Chronic systolic congestive heart failure (HCC)      Euvolemic. Continue statin, daily wts. Monitor closely. Relevant Medications    atorvastatin (LIPITOR) 80 MG tablet    carvedilol (COREG) 6.25 MG tablet    lisinopril (PRINIVIL;ZESTRIL) 2.5 MG tablet       Endocrine    Type 2 diabetes mellitus with hyperglycemia, without long-term current use of insulin ([facility])      A1c < 7. Continue metformin and lifestyle modifications. Encouraged to control intake of sugar, bread, pasta, rice, potatoes, fruit, snack foods, desserts in diet. Discussed importance of daily foot check to ensure no lesion and if any wounds noted, RTC for evaluation ASAP. Instructed to refrain from walking with open toed shoes, especially outdoors and wear foot protection even in house. Encouraged to refrain from trimming toe nails short and if unable to easily trim without injury, we will schedule podiatry referral. Recommended daily BG checks for goal of <120 fasting and <180 PP. Instructed to check BG if s/s of hypoglycemia occur-shaking, mental fog, cold sweats-and if < 70, eat cracker with peanut butter and glass of milk and recheck BG in 1 hr.            Relevant Medications    metFORMIN (GLUCOPHAGE) 500 MG tablet       Other    Productive cough      Now resolved. Normocytic anemia - Primary      Check hemoccults and r/o ORIANA. Hypernatremia      Encouraged to drink more water. Recheck Na today.           Relevant Orders    Basic Metabolic Panel        Lisa Gonzalez III, MD

## 2022-10-26 LAB
ANION GAP SERPL CALC-SCNC: 4 MMOL/L (ref 2–11)
BUN SERPL-MCNC: 11 MG/DL (ref 8–23)
CALCIUM SERPL-MCNC: 9 MG/DL (ref 8.3–10.4)
CHLORIDE SERPL-SCNC: 104 MMOL/L (ref 101–110)
CO2 SERPL-SCNC: 32 MMOL/L (ref 21–32)
CREAT SERPL-MCNC: 1 MG/DL (ref 0.6–1)
FERRITIN SERPL-MCNC: 154 NG/ML (ref 8–388)
GLUCOSE SERPL-MCNC: 105 MG/DL (ref 65–100)
IRON SERPL-MCNC: 78 UG/DL (ref 35–150)
POTASSIUM SERPL-SCNC: 4.3 MMOL/L (ref 3.5–5.1)
SODIUM SERPL-SCNC: 140 MMOL/L (ref 133–143)
TIBC SERPL-MCNC: 378 UG/DL (ref 250–450)

## 2022-10-27 PROBLEM — Z00.00 WELCOME TO MEDICARE PREVENTIVE VISIT: Status: RESOLVED | Noted: 2022-09-27 | Resolved: 2022-10-27

## 2022-10-27 PROBLEM — Z12.11 SCREENING FOR COLON CANCER: Status: RESOLVED | Noted: 2022-09-27 | Resolved: 2022-10-27

## 2022-10-27 PROBLEM — Z12.31 ENCOUNTER FOR SCREENING MAMMOGRAM FOR MALIGNANT NEOPLASM OF BREAST: Status: RESOLVED | Noted: 2022-09-27 | Resolved: 2022-10-27

## 2022-12-29 ENCOUNTER — TELEPHONE (OUTPATIENT)
Dept: FAMILY MEDICINE CLINIC | Facility: CLINIC | Age: 67
End: 2022-12-29

## 2022-12-29 NOTE — TELEPHONE ENCOUNTER
I spoke with patient's sister and told her the patient is due for her mammogram, orders are already in chart so she needs to schedule the mammogram.    Jeffrey Knight MA

## 2023-01-04 ENCOUNTER — CLINICAL DOCUMENTATION (OUTPATIENT)
Dept: SURGERY | Age: 68
End: 2023-01-04

## 2023-01-04 NOTE — PROGRESS NOTES
Referral received for routine colonoscopy- screening or surveillance only. Chart reviewed by me on 01/04/23  .        Pt found to be acceptable candidate for direct scheduling if they are interested with the following special instructions (if any): off plavix for 3 days

## 2023-01-26 ENCOUNTER — OFFICE VISIT (OUTPATIENT)
Dept: FAMILY MEDICINE CLINIC | Facility: CLINIC | Age: 68
End: 2023-01-26

## 2023-01-26 VITALS
BODY MASS INDEX: 16.06 KG/M2 | WEIGHT: 106 LBS | DIASTOLIC BLOOD PRESSURE: 70 MMHG | HEIGHT: 68 IN | SYSTOLIC BLOOD PRESSURE: 108 MMHG

## 2023-01-26 DIAGNOSIS — I25.10 CORONARY ARTERY DISEASE INVOLVING NATIVE CORONARY ARTERY OF NATIVE HEART WITHOUT ANGINA PECTORIS: ICD-10-CM

## 2023-01-26 DIAGNOSIS — R05.8 ALLERGIC COUGH: ICD-10-CM

## 2023-01-26 DIAGNOSIS — I50.22 CHRONIC SYSTOLIC CONGESTIVE HEART FAILURE (HCC): Primary | ICD-10-CM

## 2023-01-26 DIAGNOSIS — E11.65 TYPE 2 DIABETES MELLITUS WITH HYPERGLYCEMIA, WITHOUT LONG-TERM CURRENT USE OF INSULIN (HCC): ICD-10-CM

## 2023-01-26 DIAGNOSIS — Z12.11 SCREENING FOR COLON CANCER: ICD-10-CM

## 2023-01-26 DIAGNOSIS — Z95.5 HISTORY OF HEART ARTERY STENT: ICD-10-CM

## 2023-01-26 DIAGNOSIS — D64.9 NORMOCYTIC ANEMIA: ICD-10-CM

## 2023-01-26 DIAGNOSIS — R03.0 ELEVATED BLOOD PRESSURE READING: ICD-10-CM

## 2023-01-26 DIAGNOSIS — R73.09 ELEVATED HEMOGLOBIN A1C: ICD-10-CM

## 2023-01-26 RX ORDER — NITROGLYCERIN 0.4 MG/1
0.4 TABLET SUBLINGUAL EVERY 5 MIN PRN
Qty: 25 TABLET | Refills: 0 | Status: SHIPPED | OUTPATIENT
Start: 2023-01-26

## 2023-01-26 RX ORDER — CLOPIDOGREL BISULFATE 75 MG/1
75 TABLET ORAL DAILY
Qty: 30 TABLET | Refills: 13 | Status: SHIPPED | OUTPATIENT
Start: 2023-01-26 | End: 2024-02-26

## 2023-01-26 RX ORDER — NITROGLYCERIN 0.4 MG/1
0.4 TABLET SUBLINGUAL EVERY 5 MIN PRN
COMMUNITY
Start: 2021-12-18 | End: 2023-01-26 | Stop reason: SDUPTHER

## 2023-01-26 RX ORDER — ERGOCALCIFEROL 1.25 MG/1
50000 CAPSULE ORAL WEEKLY
COMMUNITY
Start: 2023-01-03

## 2023-01-26 RX ORDER — CETIRIZINE HYDROCHLORIDE 10 MG/1
10 TABLET ORAL DAILY
Qty: 90 TABLET | Refills: 3 | Status: SHIPPED | OUTPATIENT
Start: 2023-01-26 | End: 2023-02-25

## 2023-01-26 RX ORDER — LISINOPRIL 2.5 MG/1
2.5 TABLET ORAL DAILY
Qty: 30 TABLET | Refills: 11 | Status: SHIPPED | OUTPATIENT
Start: 2023-01-26 | End: 2024-01-21

## 2023-01-26 ASSESSMENT — ENCOUNTER SYMPTOMS
SORE THROAT: 0
EYE PAIN: 0
BLOOD IN STOOL: 0
NAUSEA: 0
RHINORRHEA: 0
ABDOMINAL PAIN: 0
DIARRHEA: 0
CONSTIPATION: 0
FACIAL SWELLING: 0
BACK PAIN: 0
CHEST TIGHTNESS: 0
SINUS PAIN: 0
COLOR CHANGE: 0
COUGH: 0
EYE ITCHING: 0
WHEEZING: 0
SHORTNESS OF BREATH: 0
EYE DISCHARGE: 0
VOMITING: 0
STRIDOR: 0
SINUS PRESSURE: 0
ABDOMINAL DISTENTION: 0
EYE REDNESS: 0

## 2023-01-26 ASSESSMENT — PATIENT HEALTH QUESTIONNAIRE - PHQ9
1. LITTLE INTEREST OR PLEASURE IN DOING THINGS: 0
SUM OF ALL RESPONSES TO PHQ QUESTIONS 1-9: 0
SUM OF ALL RESPONSES TO PHQ9 QUESTIONS 1 & 2: 0
2. FEELING DOWN, DEPRESSED OR HOPELESS: 0
SUM OF ALL RESPONSES TO PHQ QUESTIONS 1-9: 0

## 2023-01-26 NOTE — PROGRESS NOTES
[address] [place] [address]  _______________________________________  Jose Francisco Ravi MD                 [address] [place] [address], [address]. [place], [address] [place] [address]                     Jerad Ortega [address]                                                                                    Phone: [phone]                                                                                    Fax: [phone]    Paulo Sutton is a 74 y.o. female who is seen for evaluation of   Chief Complaint   Patient presents with    Diabetes    Coronary Artery Disease    Cholesterol Problem    Foot Pain     Left foot    Medication Refill       HPI:   Romeo Hernandez is a 75 y/o F with h/o CAD, s/p MI with PCI, chronic hep C, pre-diabetes, dyslipidemia, polysubstance abuse, here for f/u. She has previously only seen [address] [place] [address] for PCP. - CAD- ST elevation MI 12/16/2021. mid LAD with successful PCI and 2 overlapping drug-eluting stents. Follow-up echo the next day showed LV systolic dysfunction with an EF of 35 to 40%. Anterior, anterior septal, and apical akinesis. Recommended dual antiplatelet x 12 months, ASA for life, high intensity statin, and ACE inhibitor and beta-blocker. She was due to see Miguelangel Tolbert MD (cardiology) in 6/2022.     - dyslipidemia- Last LDL in 2021 was 110. Pt is on lipitor 80 mg, but denies any compliance with dietary modifications. - hep C- Pt states that she was diagnosed at [address] [place] [address] a number of years ago, but never saw GI.    - Pre-diabetes- Pt denies any limitations to diet. Her A1c was 6.7 on last check. She is on metformin 500mg BID.     - Sodium 146 on last visit. Pt indicates that she does not drink much water. - Her hgb was 11.5 on last visit. She denies any bleeding episodes. There is no available results from her last c-scope, but pt denies any BRBPR.      Lab Results   Component Value Date    IRON 78 10/25/2022    TIBC 378 10/25/2022    FERRITIN 154 10/25/2022     Lab Results   Component Value Date/Time     10/25/2022 02:53 PM    K 4.3 10/25/2022 02:53 PM     10/25/2022 02:53 PM    CO2 32 10/25/2022 02:53 PM    BUN 11 10/25/2022 02:53 PM    CREATININE 1.00 10/25/2022 02:53 PM    GLUCOSE 105 10/25/2022 02:53 PM    CALCIUM 9.0 10/25/2022 02:53 PM        Review of Systems:  Review of Systems   Constitutional:  Negative for activity change, appetite change, chills, diaphoresis, fatigue, fever and unexpected weight change. HENT:  Negative for congestion, ear discharge, ear pain, facial swelling, hearing loss, mouth sores, nosebleeds, postnasal drip, rhinorrhea, sinus pressure, sinus pain, sore throat and tinnitus. Eyes:  Negative for pain, discharge, redness, itching and visual disturbance. Respiratory:  Negative for cough, chest tightness, shortness of breath, wheezing and stridor. Cardiovascular:  Negative for chest pain, palpitations and leg swelling. Gastrointestinal:  Negative for abdominal distention, abdominal pain, blood in stool, constipation, diarrhea, nausea and vomiting. Endocrine: Negative for cold intolerance, heat intolerance, polydipsia, polyphagia and polyuria. Genitourinary:  Negative for decreased urine volume, difficulty urinating, dysuria, flank pain, frequency, hematuria and urgency. Musculoskeletal:  Negative for arthralgias, back pain, gait problem, joint swelling, myalgias and neck pain. Skin:  Negative for color change, pallor, rash and wound. Neurological:  Negative for dizziness, tremors, seizures, syncope, facial asymmetry, speech difficulty, weakness, light-headedness, numbness and headaches. Psychiatric/Behavioral:  Negative for agitation, behavioral problems, confusion, hallucinations, self-injury, sleep disturbance and suicidal ideas. The patient is not nervous/anxious.        History:  Past Medical History:   Diagnosis Date    CAD (coronary artery disease)     Hyperlipidemia        Past Surgical History:   Procedure Laterality Date    FACIAL FRACTURE SURGERY  1980       Family History   Problem Relation Age of Onset    Diabetes Mother     Heart Disease Father     Cancer Father        Social History     Tobacco Use    Smoking status: Every Day     Packs/day: 0.25     Years: 52.00     Pack years: 13.00     Types: Cigarettes     Start date: 1970    Smokeless tobacco: Never   Substance Use Topics    Alcohol use: Yes     Comment: socially       No Known Allergies    Current Outpatient Medications   Medication Sig Dispense Refill    ergocalciferol (ERGOCALCIFEROL) 1.25 MG (79249 UT) capsule Take 50,000 Units by mouth once a week      clopidogrel (PLAVIX) 75 MG tablet Take 1 tablet by mouth daily 30 tablet 13    lisinopril (PRINIVIL;ZESTRIL) 2.5 MG tablet Take 1 tablet by mouth daily 30 tablet 11    nitroGLYCERIN (NITROSTAT) 0.4 MG SL tablet Place 1 tablet under the tongue every 5 minutes as needed for Chest pain 25 tablet 0    cetirizine (ZYRTEC) 10 MG tablet Take 1 tablet by mouth daily 90 tablet 3    metFORMIN (GLUCOPHAGE) 500 MG tablet Take 1 tablet by mouth 2 times daily (with meals) 180 tablet 3    atorvastatin (LIPITOR) 80 MG tablet Take 80 mg by mouth      carvedilol (COREG) 6.25 MG tablet Take 6.25 mg by mouth 2 times daily (with meals)       No current facility-administered medications for this visit. Vitals:    /70 (Site: Left Upper Arm, Position: Sitting)   Ht 5' 8\" (1.727 m)   Wt 106 lb (48.1 kg)   BMI 16.12 kg/m²     Physical Exam:  Physical Exam  Vitals reviewed. Constitutional:       General: She is not in acute distress. Appearance: Normal appearance. She is not ill-appearing, toxic-appearing or diaphoretic. HENT:      Head: Normocephalic and atraumatic. Right Ear: Tympanic membrane, ear canal and external ear normal. There is no impacted cerumen. Left Ear: Tympanic membrane, ear canal and external ear normal. There is no impacted cerumen. Nose: Nose normal. No congestion or rhinorrhea.       Mouth/Throat: Mouth: Mucous membranes are moist.      Pharynx: No oropharyngeal exudate or posterior oropharyngeal erythema. Eyes:      General: No scleral icterus. Right eye: No discharge. Left eye: No discharge. Extraocular Movements: Extraocular movements intact. Conjunctiva/sclera: Conjunctivae normal.      Pupils: Pupils are equal, round, and reactive to light. Cardiovascular:      Rate and Rhythm: Normal rate and regular rhythm. Pulses: Normal pulses. Heart sounds: Normal heart sounds. No murmur heard. No friction rub. No gallop. Pulmonary:      Effort: Pulmonary effort is normal. No respiratory distress. Breath sounds: Normal breath sounds. No stridor. No wheezing, rhonchi or rales. Chest:      Chest wall: No tenderness. Abdominal:      General: Abdomen is flat. Bowel sounds are normal. There is no distension. Palpations: Abdomen is soft. There is no mass. Tenderness: There is no abdominal tenderness. There is no right CVA tenderness, left CVA tenderness, guarding or rebound. Musculoskeletal:         General: No swelling, tenderness, deformity or signs of injury. Cervical back: Neck supple. No rigidity or tenderness. Right lower leg: No edema. Left lower leg: No edema. Lymphadenopathy:      Cervical: No cervical adenopathy. Skin:     General: Skin is warm and dry. Coloration: Skin is not jaundiced or pale. Findings: No bruising, erythema, lesion or rash. Neurological:      General: No focal deficit present. Mental Status: She is alert. Mental status is at baseline. Motor: No weakness. Coordination: Coordination normal.      Gait: Gait normal.   Psychiatric:         Mood and Affect: Mood normal.         Behavior: Behavior normal.         Thought Content: Thought content normal.         Judgment: Judgment normal.       Assessment/Plan:     ICD-10-CM    1.  Chronic systolic congestive heart failure (HCC)  I50.22 lisinopril (PRINIVIL;ZESTRIL) 2.5 MG tablet     nitroGLYCERIN (NITROSTAT) 0.4 MG SL tablet      2. Type 2 diabetes mellitus with hyperglycemia, without long-term current use of insulin (HCC)  E11.65       3. Coronary artery disease involving native coronary artery of native heart without angina pectoris  I25.10 clopidogrel (PLAVIX) 75 MG tablet      4. Allergic cough  R05.8 cetirizine (ZYRTEC) 10 MG tablet      5. Elevated blood pressure reading  R03.0       6. History of heart artery stent  Z95.5 clopidogrel (PLAVIX) 75 MG tablet      7. Normocytic anemia  D64.9 CBC with Auto Differential      8. Elevated hemoglobin A1c  R73.09       9. Screening for colon cancer  Z12.11 [facility]           Problem List          Circulatory    Coronary artery disease involving native coronary artery of native heart without angina pectoris     Stable. No angina. Encouraged compliance with medications. Stressed importance of low sodium and low fat diet (limit processed foods, fast foods and avoid adding salt to cooked food), reduce red meat, butter, mayonnaise, egg yolks [<1 egg yolk daily] and dairy products, increase daily exercise [at least 30 sustained minutes 3-5 times weekly])           Relevant Medications    atorvastatin (LIPITOR) 80 MG tablet    carvedilol (COREG) 6.25 MG tablet    clopidogrel (PLAVIX) 75 MG tablet    lisinopril (PRINIVIL;ZESTRIL) 2.5 MG tablet    nitroGLYCERIN (NITROSTAT) 0.4 MG SL tablet    Elevated blood pressure reading      BP is very good today. Continue monitoring. Encouraged heart healthy, low-sodium diet, regular aerobic exercise. Chronic systolic congestive heart failure ([address] - Primary     Wt stable. Encouraged low sodium diet. Continue ACEi, BB, daily wts.           Relevant Medications    atorvastatin (LIPITOR) 80 MG tablet    carvedilol (COREG) 6.25 MG tablet    lisinopril (PRINIVIL;ZESTRIL) 2.5 MG tablet    nitroGLYCERIN (NITROSTAT) 0.4 MG SL tablet       Endocrine    Type 2 diabetes mellitus with hyperglycemia, without long-term current use of insulin ([facility])      A1c 6.7. no hypoglycemia. Continue metformin. Encouraged to control intake of sugar, bread, pasta, rice, potatoes, fruit, snack foods, desserts in diet. Discussed importance of daily foot check to ensure no lesion and if any wounds noted, RTC for evaluation ASAP. Instructed to refrain from walking with open toed shoes, especially outdoors and wear foot protection even in house. Encouraged to refrain from trimming toe nails short and if unable to easily trim without injury, we will schedule podiatry referral. Recommended daily BG checks for goal of <120 fasting and <180 PP. Instructed to check BG if s/s of hypoglycemia occur-shaking, mental fog, cold sweats-and if < 70, eat cracker with peanut butter and glass of milk and recheck BG in 1 hr.               Other    History of heart artery stent      Stable on plavix. Continue statin. Relevant Medications    clopidogrel (PLAVIX) 75 MG tablet    Normocytic anemia      Still need hemoccult cards. GI was unable to reach pt to schedule. New referral placed. Discussed importance of pt accepting call to schedule appt.           Relevant Orders    CBC with Auto Differential    RESOLVED: Elevated hemoglobin A1c        Gertrudis Kidney III, MD

## 2023-01-27 PROBLEM — R73.09 ELEVATED HEMOGLOBIN A1C: Status: RESOLVED | Noted: 2022-09-27 | Resolved: 2023-01-27

## 2023-01-27 NOTE — ASSESSMENT & PLAN NOTE
BP is very good today. Continue monitoring. Encouraged heart healthy, low-sodium diet, regular aerobic exercise.

## 2023-01-27 NOTE — ASSESSMENT & PLAN NOTE
Stable. No angina. Encouraged compliance with medications.  Stressed importance of low sodium and low fat diet (limit processed foods, fast foods and avoid adding salt to cooked food), reduce red meat, butter, mayonnaise, egg yolks [<1 egg yolk daily] and dairy products, increase daily exercise [at least 30 sustained minutes 3-5 times weekly])

## 2023-01-27 NOTE — ASSESSMENT & PLAN NOTE
Still need hemoccult cards. GI was unable to reach pt to schedule. New referral placed. Discussed importance of pt accepting call to schedule appt.

## 2023-01-27 NOTE — ASSESSMENT & PLAN NOTE
A1c 6.7. no hypoglycemia. Continue metformin. Encouraged to control intake of sugar, bread, pasta, rice, potatoes, fruit, snack foods, desserts in diet. Discussed importance of daily foot check to ensure no lesion and if any wounds noted, RTC for evaluation ASAP. Instructed to refrain from walking with open toed shoes, especially outdoors and wear foot protection even in house. Encouraged to refrain from trimming toe nails short and if unable to easily trim without injury, we will schedule podiatry referral. Recommended daily BG checks for goal of <120 fasting and <180 PP. Instructed to check BG if s/s of hypoglycemia occur-shaking, mental fog, cold sweats-and if < 70, eat cracker with peanut butter and glass of milk and recheck BG in 1 hr.

## 2023-02-20 ENCOUNTER — PREP FOR PROCEDURE (OUTPATIENT)
Dept: SURGERY | Age: 68
End: 2023-02-20

## 2023-02-20 PROBLEM — Z12.11 SPECIAL SCREENING FOR MALIGNANT NEOPLASMS, COLON: Status: ACTIVE | Noted: 2023-02-20

## 2023-02-28 LAB
LEFT VENTRICULAR EJECTION FRACTION HIGH VALUE: 50 %
LEFT VENTRICULAR EJECTION FRACTION MODE: NORMAL
LV EF: 45 %

## 2023-03-22 PROBLEM — Z12.11 SPECIAL SCREENING FOR MALIGNANT NEOPLASMS, COLON: Status: RESOLVED | Noted: 2023-02-20 | Resolved: 2023-03-22

## 2023-04-19 DIAGNOSIS — D64.9 NORMOCYTIC ANEMIA: ICD-10-CM

## 2023-04-19 LAB
BASOPHILS # BLD: 0 K/UL (ref 0–0.2)
BASOPHILS NFR BLD: 1 % (ref 0–2)
DIFFERENTIAL METHOD BLD: NORMAL
EOSINOPHIL # BLD: 0 K/UL (ref 0–0.8)
EOSINOPHIL NFR BLD: 1 % (ref 0.5–7.8)
ERYTHROCYTE [DISTWIDTH] IN BLOOD BY AUTOMATED COUNT: 14.2 % (ref 11.9–14.6)
HCT VFR BLD AUTO: 38.8 % (ref 35.8–46.3)
HGB BLD-MCNC: 12.5 G/DL (ref 11.7–15.4)
IMM GRANULOCYTES # BLD AUTO: 0 K/UL (ref 0–0.5)
IMM GRANULOCYTES NFR BLD AUTO: 0 % (ref 0–5)
LYMPHOCYTES # BLD: 2.5 K/UL (ref 0.5–4.6)
LYMPHOCYTES NFR BLD: 44 % (ref 13–44)
MCH RBC QN AUTO: 28.3 PG (ref 26.1–32.9)
MCHC RBC AUTO-ENTMCNC: 32.2 G/DL (ref 31.4–35)
MCV RBC AUTO: 88 FL (ref 82–102)
MONOCYTES # BLD: 0.5 K/UL (ref 0.1–1.3)
MONOCYTES NFR BLD: 8 % (ref 4–12)
NEUTS SEG # BLD: 2.6 K/UL (ref 1.7–8.2)
NEUTS SEG NFR BLD: 46 % (ref 43–78)
NRBC # BLD: 0 K/UL (ref 0–0.2)
PLATELET # BLD AUTO: 257 K/UL (ref 150–450)
PMV BLD AUTO: 10.7 FL (ref 9.4–12.3)
RBC # BLD AUTO: 4.41 M/UL (ref 4.05–5.2)
WBC # BLD AUTO: 5.6 K/UL (ref 4.3–11.1)

## 2023-04-27 ENCOUNTER — OFFICE VISIT (OUTPATIENT)
Dept: FAMILY MEDICINE CLINIC | Facility: CLINIC | Age: 68
End: 2023-04-27
Payer: COMMERCIAL

## 2023-04-27 VITALS
RESPIRATION RATE: 12 BRPM | HEART RATE: 80 BPM | HEIGHT: 69 IN | WEIGHT: 99 LBS | SYSTOLIC BLOOD PRESSURE: 112 MMHG | BODY MASS INDEX: 14.66 KG/M2 | DIASTOLIC BLOOD PRESSURE: 80 MMHG

## 2023-04-27 DIAGNOSIS — Z95.5 HISTORY OF HEART ARTERY STENT: ICD-10-CM

## 2023-04-27 DIAGNOSIS — E11.65 TYPE 2 DIABETES MELLITUS WITH HYPERGLYCEMIA, WITHOUT LONG-TERM CURRENT USE OF INSULIN (HCC): ICD-10-CM

## 2023-04-27 DIAGNOSIS — Z86.19 HISTORY OF HEPATITIS C: ICD-10-CM

## 2023-04-27 DIAGNOSIS — F17.200 SMOKER: ICD-10-CM

## 2023-04-27 DIAGNOSIS — D64.9 NORMOCYTIC ANEMIA: ICD-10-CM

## 2023-04-27 DIAGNOSIS — I25.10 CORONARY ARTERY DISEASE INVOLVING NATIVE CORONARY ARTERY OF NATIVE HEART WITHOUT ANGINA PECTORIS: ICD-10-CM

## 2023-04-27 DIAGNOSIS — E78.5 DYSLIPIDEMIA: ICD-10-CM

## 2023-04-27 DIAGNOSIS — I50.22 CHRONIC SYSTOLIC CONGESTIVE HEART FAILURE (HCC): Primary | ICD-10-CM

## 2023-04-27 PROCEDURE — 1123F ACP DISCUSS/DSCN MKR DOCD: CPT | Performed by: FAMILY MEDICINE

## 2023-04-27 PROCEDURE — 99214 OFFICE O/P EST MOD 30 MIN: CPT | Performed by: FAMILY MEDICINE

## 2023-04-27 RX ORDER — ASPIRIN 81 MG/1
81 TABLET ORAL DAILY
COMMUNITY

## 2023-04-27 RX ORDER — ISOSORBIDE MONONITRATE 30 MG/1
30 TABLET, EXTENDED RELEASE ORAL DAILY
COMMUNITY

## 2023-04-27 ASSESSMENT — ENCOUNTER SYMPTOMS
CHEST TIGHTNESS: 0
DIARRHEA: 0
ABDOMINAL PAIN: 0
EYE PAIN: 0
EYE REDNESS: 0
SHORTNESS OF BREATH: 0
VOMITING: 0
RHINORRHEA: 0
CONSTIPATION: 0
ABDOMINAL DISTENTION: 0
COUGH: 0
BLOOD IN STOOL: 0
BACK PAIN: 0
EYE ITCHING: 0
SINUS PRESSURE: 0
EYE DISCHARGE: 0
COLOR CHANGE: 0
STRIDOR: 0
FACIAL SWELLING: 0
WHEEZING: 0
SORE THROAT: 0
SINUS PAIN: 0
NAUSEA: 0

## 2023-04-27 ASSESSMENT — PATIENT HEALTH QUESTIONNAIRE - PHQ9
SUM OF ALL RESPONSES TO PHQ QUESTIONS 1-9: 0
2. FEELING DOWN, DEPRESSED OR HOPELESS: 0
SUM OF ALL RESPONSES TO PHQ9 QUESTIONS 1 & 2: 0
SUM OF ALL RESPONSES TO PHQ QUESTIONS 1-9: 0
1. LITTLE INTEREST OR PLEASURE IN DOING THINGS: 0

## 2023-04-27 NOTE — PROGRESS NOTES
[address] [place] [address]  _______________________________________  Gigi Gonzales MD                 [address] [place] [address],  [address]. [place], [address] [place] [address]                     Jerad Fagan 2                                                                                    Phone: [phone]                                                                                    Fax: [phone]    Yue Manley is a 74 y.o. female who is seen for evaluation of No chief complaint on file. HPI:   Roddy Carreno is a 75 y/o F with h/o CAD, s/p MI with PCI, chronic hep C, pre-diabetes, dyslipidemia, polysubstance abuse, here for f/u. She has previously only seen [facility] for PCP. - Had NSTEMI in 2/2023. She underwent LHC on 3/1 with severe small branch vessel disease. Cards felt this was best treated medically. She was positive for cocaine and cannabinoid on urine drug screen. Apparently she had not been compliant with DAPT, statin prior to event. - CAD- ST elevation MI 12/16/2021. mid LAD with successful PCI and 2 overlapping drug-eluting stents. Follow-up echo the next day showed LV systolic dysfunction with an EF of 35 to 40%. Anterior, anterior septal, and apical akinesis. Recommended dual antiplatelet x 12 months, ASA for life, high intensity statin, and ACE inhibitor and beta-blocker. She was due to see James Tomlinson MD (cardiology) in 6/2022.     - dyslipidemia- Last LDL in 2021 was 110. Pt is on lipitor 80 mg, but denies any compliance with dietary modifications. - hep C- Pt states that she was diagnosed at [facility] a number of years ago, but never saw GI.    - Pre-diabetes- Pt denies any limitations to diet. Her A1c was 6.7 on last check. She is on metformin 500mg BID.     - Sodium 146 on last visit. Pt indicates that she does not drink much water. - Her hgb was 11.5 on last visit. She denies any bleeding episodes.  There is no available results from her last c-scope, but pt denies any

## 2023-04-27 NOTE — ASSESSMENT & PLAN NOTE
F/u a1c. Pt encouraged to check BG. Continue metformin. Encouraged to control intake of sugar, bread, pasta, rice, potatoes, fruit, snack foods, desserts in diet. Discussed importance of daily foot check to ensure no lesion and if any wounds noted, RTC for evaluation ASAP. Instructed to refrain from walking with open toed shoes, especially outdoors and wear foot protection even in house. Encouraged to refrain from trimming toe nails short and if unable to easily trim without injury, we will schedule podiatry referral. Recommended daily BG checks for goal of <120 fasting and <180 PP. Instructed to check BG if s/s of hypoglycemia occur-shaking, mental fog, cold sweats-and if < 70, eat cracker with peanut butter and glass of milk and recheck BG in 1 hr.

## 2023-04-27 NOTE — ASSESSMENT & PLAN NOTE
Goal LDL < 70. Continue statin. Encouraged to reduce red meat, fried foods, fast foods, butter, mayonnaise and dairy products; increase daily exercise and limit intake of egg yolks to < 7 egg yolks weekly.

## 2023-04-28 LAB
EST. AVERAGE GLUCOSE BLD GHB EST-MCNC: 143 MG/DL
HBA1C MFR BLD: 6.6 % (ref 4.8–5.6)

## 2023-05-01 ENCOUNTER — PREP FOR PROCEDURE (OUTPATIENT)
Dept: SURGERY | Age: 68
End: 2023-05-01

## 2023-05-01 RX ORDER — SODIUM CHLORIDE 0.9 % (FLUSH) 0.9 %
5-40 SYRINGE (ML) INJECTION PRN
Status: CANCELLED | OUTPATIENT
Start: 2023-05-01

## 2023-05-01 RX ORDER — SODIUM CHLORIDE 0.9 % (FLUSH) 0.9 %
5-40 SYRINGE (ML) INJECTION EVERY 12 HOURS SCHEDULED
Status: CANCELLED | OUTPATIENT
Start: 2023-05-01

## 2023-05-01 RX ORDER — SODIUM CHLORIDE 9 MG/ML
INJECTION, SOLUTION INTRAVENOUS PRN
Status: CANCELLED | OUTPATIENT
Start: 2023-05-01

## 2023-05-15 PROBLEM — Z12.11 SPECIAL SCREENING FOR MALIGNANT NEOPLASMS, COLON: Status: ACTIVE | Noted: 2023-02-20

## 2023-05-25 ENCOUNTER — PREP FOR PROCEDURE (OUTPATIENT)
Dept: SURGERY | Age: 68
End: 2023-05-25

## 2023-06-14 PROBLEM — Z12.11 SPECIAL SCREENING FOR MALIGNANT NEOPLASMS, COLON: Status: RESOLVED | Noted: 2023-02-20 | Resolved: 2023-06-14

## 2023-08-22 ENCOUNTER — OFFICE VISIT (OUTPATIENT)
Dept: FAMILY MEDICINE CLINIC | Facility: CLINIC | Age: 68
End: 2023-08-22
Payer: MEDICARE

## 2023-08-22 VITALS
WEIGHT: 95.8 LBS | DIASTOLIC BLOOD PRESSURE: 80 MMHG | HEIGHT: 69 IN | BODY MASS INDEX: 14.19 KG/M2 | SYSTOLIC BLOOD PRESSURE: 138 MMHG

## 2023-08-22 DIAGNOSIS — I25.10 CORONARY ARTERY DISEASE INVOLVING NATIVE CORONARY ARTERY OF NATIVE HEART WITHOUT ANGINA PECTORIS: ICD-10-CM

## 2023-08-22 DIAGNOSIS — I50.22 CHRONIC SYSTOLIC CONGESTIVE HEART FAILURE (HCC): ICD-10-CM

## 2023-08-22 DIAGNOSIS — E11.65 TYPE 2 DIABETES MELLITUS WITH HYPERGLYCEMIA, WITHOUT LONG-TERM CURRENT USE OF INSULIN (HCC): Primary | ICD-10-CM

## 2023-08-22 DIAGNOSIS — E78.5 DYSLIPIDEMIA: ICD-10-CM

## 2023-08-22 DIAGNOSIS — Z86.19 HISTORY OF HEPATITIS C: ICD-10-CM

## 2023-08-22 PROCEDURE — G8400 PT W/DXA NO RESULTS DOC: HCPCS | Performed by: FAMILY MEDICINE

## 2023-08-22 PROCEDURE — G8419 CALC BMI OUT NRM PARAM NOF/U: HCPCS | Performed by: FAMILY MEDICINE

## 2023-08-22 PROCEDURE — 4004F PT TOBACCO SCREEN RCVD TLK: CPT | Performed by: FAMILY MEDICINE

## 2023-08-22 PROCEDURE — 1090F PRES/ABSN URINE INCON ASSESS: CPT | Performed by: FAMILY MEDICINE

## 2023-08-22 PROCEDURE — 1123F ACP DISCUSS/DSCN MKR DOCD: CPT | Performed by: FAMILY MEDICINE

## 2023-08-22 PROCEDURE — 3044F HG A1C LEVEL LT 7.0%: CPT | Performed by: FAMILY MEDICINE

## 2023-08-22 PROCEDURE — 99214 OFFICE O/P EST MOD 30 MIN: CPT | Performed by: FAMILY MEDICINE

## 2023-08-22 PROCEDURE — G8427 DOCREV CUR MEDS BY ELIG CLIN: HCPCS | Performed by: FAMILY MEDICINE

## 2023-08-22 PROCEDURE — 3017F COLORECTAL CA SCREEN DOC REV: CPT | Performed by: FAMILY MEDICINE

## 2023-08-22 PROCEDURE — 2022F DILAT RTA XM EVC RTNOPTHY: CPT | Performed by: FAMILY MEDICINE

## 2023-08-22 SDOH — ECONOMIC STABILITY: HOUSING INSECURITY
IN THE LAST 12 MONTHS, WAS THERE A TIME WHEN YOU DID NOT HAVE A STEADY PLACE TO SLEEP OR SLEPT IN A SHELTER (INCLUDING NOW)?: NO

## 2023-08-22 SDOH — ECONOMIC STABILITY: INCOME INSECURITY: HOW HARD IS IT FOR YOU TO PAY FOR THE VERY BASICS LIKE FOOD, HOUSING, MEDICAL CARE, AND HEATING?: SOMEWHAT HARD

## 2023-08-22 SDOH — ECONOMIC STABILITY: FOOD INSECURITY: WITHIN THE PAST 12 MONTHS, THE FOOD YOU BOUGHT JUST DIDN'T LAST AND YOU DIDN'T HAVE MONEY TO GET MORE.: NEVER TRUE

## 2023-08-22 SDOH — ECONOMIC STABILITY: FOOD INSECURITY: WITHIN THE PAST 12 MONTHS, YOU WORRIED THAT YOUR FOOD WOULD RUN OUT BEFORE YOU GOT MONEY TO BUY MORE.: NEVER TRUE

## 2023-08-22 ASSESSMENT — ENCOUNTER SYMPTOMS
DIARRHEA: 0
BLOOD IN STOOL: 0
CHEST TIGHTNESS: 0
ABDOMINAL PAIN: 0
COLOR CHANGE: 0
EYE PAIN: 0
SINUS PRESSURE: 0
SINUS PAIN: 0
NAUSEA: 0
WHEEZING: 0
STRIDOR: 0
EYE ITCHING: 0
BACK PAIN: 0
EYE REDNESS: 0
RHINORRHEA: 0
COUGH: 0
VOMITING: 0
CONSTIPATION: 0
FACIAL SWELLING: 0
EYE DISCHARGE: 0
SORE THROAT: 0
SHORTNESS OF BREATH: 0
ABDOMINAL DISTENTION: 0

## 2023-08-22 NOTE — ASSESSMENT & PLAN NOTE
Continue statin. Encouraged to reduce red meat, fried foods, fast foods, butter, mayonnaise and dairy products; increase daily exercise and limit intake of egg yolks to < 7 egg yolks weekly. LDL goal < 70.

## 2023-08-22 NOTE — PROGRESS NOTES
[address]  _______________________________________  Brittney Olvera MD                 [address] [place]                     [place], [address] [facility]                                                                                    Phone: [phone]                                                                                    Fax: [phone]    Patricia Holloway is a 79 y.o. female who is seen for evaluation of   Chief Complaint   Patient presents with    Coronary Artery Disease    Hepatitis     Discuss DX    Diabetes    Cholesterol Problem    Hypertension       HPI:   [facility] is a 73 y/o F with h/o CAD, s/p MI with PCI, chronic hep C, pre-diabetes, dyslipidemia, polysubstance abuse, here for f/u. - Had NSTEMI in 2/2023. She underwent LHC on 3/1 with severe small branch vessel disease. Cards felt this was best treated medically. She was positive for cocaine and cannabinoid on urine drug screen. Apparently she had not been compliant with DAPT, statin prior to event. - CAD- ST elevation MI 12/16/2021. mid LAD with successful PCI and 2 overlapping drug-eluting stents. Follow-up echo the next day showed LV systolic dysfunction with an EF of 35 to 40%. Anterior, anterior septal, and apical akinesis. Recommended dual antiplatelet x 12 months, ASA for life, high intensity statin, and ACE inhibitor and beta-blocker. She was due to see Jenna Mcknight MD (cardiology) in 6/2022.     - dyslipidemia- Last LDL in 2021 was 110. Pt is on lipitor 80 mg, but denies any compliance with dietary modifications. - hep C- Pt states that she was diagnosed at [facility] a number of years ago, but never saw GI.    - type 2 DM- Pt denies any limitations to diet. Her A1c was 6.6<--6.7. She is on metformin 500mg BID.     - Sodium 146 on last visit. Pt indicates that she does not drink much water. - Her hgb was 11.5 on last visit. She denies any bleeding episodes.  There

## 2023-08-22 NOTE — ASSESSMENT & PLAN NOTE
a1c at goal. Not checking home BG. Advised she needs to check BG daily. Encouraged to control intake of sugar, bread, pasta, rice, potatoes, fruit, snack foods, desserts in diet. Discussed importance of daily foot check to ensure no lesion and if any wounds noted, RTC for evaluation ASAP. Instructed to refrain from walking with open toed shoes, especially outdoors and wear foot protection even in house. Encouraged to refrain from trimming toe nails short and if unable to easily trim without injury, we will schedule podiatry referral. Recommended daily BG checks for goal of <120 fasting and <180 PP. Instructed to check BG if s/s of hypoglycemia occur-shaking, mental fog, cold sweats-and if < 70, eat cracker with peanut butter and glass of milk and recheck BG in 1 hr.

## 2023-09-12 RX ORDER — GLUCOSAMINE HCL/CHONDROITIN SU 500-400 MG
CAPSULE ORAL
Qty: 300 STRIP | Refills: 5 | Status: SHIPPED | OUTPATIENT
Start: 2023-09-12

## 2023-09-12 RX ORDER — ISOPROPYL ALCOHOL 0.75 G/1
1 SWAB TOPICAL DAILY
Qty: 100 EACH | Refills: 3 | Status: SHIPPED | OUTPATIENT
Start: 2023-09-12

## 2023-09-12 RX ORDER — BLOOD-GLUCOSE CONTROL, LOW
1 EACH MISCELLANEOUS DAILY
Qty: 1 EACH | Refills: 3 | Status: SHIPPED | OUTPATIENT
Start: 2023-09-12

## 2023-09-12 RX ORDER — BLOOD-GLUCOSE METER
1 EACH MISCELLANEOUS DAILY
Qty: 1 EACH | Refills: 0 | Status: SHIPPED | OUTPATIENT
Start: 2023-09-12

## 2023-09-12 RX ORDER — GLUCOSAM/CHON-MSM1/C/MANG/BOSW 500-416.6
1 TABLET ORAL DAILY
Qty: 100 EACH | Refills: 3 | Status: SHIPPED | OUTPATIENT
Start: 2023-09-12

## 2023-10-03 ENCOUNTER — PREP FOR PROCEDURE (OUTPATIENT)
Dept: SURGERY | Age: 68
End: 2023-10-03

## 2023-10-03 RX ORDER — SODIUM CHLORIDE 0.9 % (FLUSH) 0.9 %
5-40 SYRINGE (ML) INJECTION PRN
Status: CANCELLED | OUTPATIENT
Start: 2023-10-03

## 2023-10-03 RX ORDER — SODIUM CHLORIDE 9 MG/ML
INJECTION, SOLUTION INTRAVENOUS PRN
Status: CANCELLED | OUTPATIENT
Start: 2023-10-03

## 2023-10-03 RX ORDER — SODIUM CHLORIDE 0.9 % (FLUSH) 0.9 %
5-40 SYRINGE (ML) INJECTION EVERY 12 HOURS SCHEDULED
Status: CANCELLED | OUTPATIENT
Start: 2023-10-03

## 2023-10-12 NOTE — PERIOP NOTE
Patient verified name, , and procedure. Type: 1a; abbreviated assessment per anesthesia guidelines    Labs per anesthesia: none  Chart flagged for anesthesia to review the following:   - Patient had NSTEMI 2023- severe small vessel disease being treated medically  - Stemi 21- 2 stents  - Patient states she has not been taking 81 mg aspirin for 2 months d/t difficulties obtaining from pharmacy? ? Patient verbalizes understanding to resume taking 81 mg aspirin immediately  - polysubstance abuse - per note in Care everywhere tested positive for cocaine and cannabinoid 2023. Patient does admit to smoking pot 3-4 times weekly  - EF 45-50% per ECHO 23  Chart flagged for charge nurse. Instructed pt that they will be notified the day before their procedure by the GI Lab for time of arrival if their procedure is [facility] and Pre-op for [place] cases. Arrival times should be called by 5 pm. If no phone is received the patient should contact their respective hospital. The GI lab telephone number is [phone] and ES Pre-op is [phone]. Follow diet and prep instructions per office including NPO status. If patient has NOT received instructions from office patient is advised to call surgeon office, verbalizes understanding. Bath or shower the night before and the am of surgery with non-moisturizing soap. No lotions, oils, powders, cologne on skin. No make up, eye make up or jewelry. Wear loose fitting comfortable, clean clothing. Must have adult present in building the entire time . Medications for the day of procedure aspirin 81 mg, isosorbide, atorvastatin, carvedilol patient to hold Patient to be advised on whether or not to hold anticoagulant by the surgeon. Patient instructed if anticoagulant is held, an ASA 81 mg should be taken per anesthesia protocol. Do not take metformin or lisinopril on the morning of procedure.      Patient states she does not have instructions regarding whether or

## 2023-10-12 NOTE — PERIOP NOTE
The following records have been requested from [facility]:       ZIPDIGS    Please send EKG, ECHO, Stress, and last office visit via fax to [phone]. Thank you!

## 2023-10-13 NOTE — PROGRESS NOTES
Dr. Carlee Portillo reviewed chart. New order received \"must start taking 81 mg ASA today and NO more substances until after procedures. If she can comply she can have her procedure, otherwise she may get postponed. \" Marta Franco to proceed. Patient notified and verbalized understanding.

## 2023-10-16 PROBLEM — Z12.11 SPECIAL SCREENING FOR MALIGNANT NEOPLASMS, COLON: Status: ACTIVE | Noted: 2023-05-25

## 2023-10-16 NOTE — PROGRESS NOTES
Patient contacted to verify procedure, doctor, NPO orders, and place/time of arrival. Contact made with son and said he would relay the information to her, patients voicemail was full. Arrival time of 0715.

## 2023-10-16 NOTE — PROGRESS NOTES
Confirmed arrival time  of 0715, location,  requirement, and instructions for registration at the hospital.  Pt verbalized understanding. Patient is unsure if she will have a  and be able to come. Patient is aware to call in the morning to update staff.

## 2023-10-17 ENCOUNTER — HOSPITAL ENCOUNTER (OUTPATIENT)
Age: 68
Setting detail: OUTPATIENT SURGERY
Discharge: HOME | End: 2023-10-17
Attending: STUDENT IN AN ORGANIZED HEALTH CARE EDUCATION/TRAINING PROGRAM | Admitting: STUDENT IN AN ORGANIZED HEALTH CARE EDUCATION/TRAINING PROGRAM
Payer: MEDICARE

## 2023-10-17 ENCOUNTER — ANESTHESIA (OUTPATIENT)
Dept: ENDOSCOPY | Age: 68
End: 2023-10-17
Payer: MEDICARE

## 2023-10-17 ENCOUNTER — ANESTHESIA EVENT (OUTPATIENT)
Dept: ENDOSCOPY | Age: 68
End: 2023-10-17
Payer: MEDICARE

## 2023-10-17 VITALS
TEMPERATURE: 97.2 F | SYSTOLIC BLOOD PRESSURE: 166 MMHG | HEART RATE: 60 BPM | BODY MASS INDEX: 14.07 KG/M2 | DIASTOLIC BLOOD PRESSURE: 78 MMHG | OXYGEN SATURATION: 98 % | WEIGHT: 95 LBS | HEIGHT: 69 IN | RESPIRATION RATE: 15 BRPM

## 2023-10-17 LAB
GLUCOSE BLD STRIP.AUTO-MCNC: 111 MG/DL (ref 65–100)
SERVICE CMNT-IMP: ABNORMAL

## 2023-10-17 PROCEDURE — 7100000011 HC PHASE II RECOVERY - ADDTL 15 MIN: Performed by: STUDENT IN AN ORGANIZED HEALTH CARE EDUCATION/TRAINING PROGRAM

## 2023-10-17 PROCEDURE — 3609010400 HC COLONOSCOPY POLYPECTOMY HOT BIOPSY: Performed by: STUDENT IN AN ORGANIZED HEALTH CARE EDUCATION/TRAINING PROGRAM

## 2023-10-17 PROCEDURE — 7100000010 HC PHASE II RECOVERY - FIRST 15 MIN: Performed by: STUDENT IN AN ORGANIZED HEALTH CARE EDUCATION/TRAINING PROGRAM

## 2023-10-17 PROCEDURE — 3700000000 HC ANESTHESIA ATTENDED CARE: Performed by: STUDENT IN AN ORGANIZED HEALTH CARE EDUCATION/TRAINING PROGRAM

## 2023-10-17 PROCEDURE — 88305 TISSUE EXAM BY PATHOLOGIST: CPT

## 2023-10-17 PROCEDURE — 2709999900 HC NON-CHARGEABLE SUPPLY: Performed by: STUDENT IN AN ORGANIZED HEALTH CARE EDUCATION/TRAINING PROGRAM

## 2023-10-17 PROCEDURE — 2580000003 HC RX 258: Performed by: ANESTHESIOLOGY

## 2023-10-17 PROCEDURE — 3700000001 HC ADD 15 MINUTES (ANESTHESIA): Performed by: STUDENT IN AN ORGANIZED HEALTH CARE EDUCATION/TRAINING PROGRAM

## 2023-10-17 PROCEDURE — 6360000002 HC RX W HCPCS: Performed by: REGISTERED NURSE

## 2023-10-17 PROCEDURE — 82962 GLUCOSE BLOOD TEST: CPT

## 2023-10-17 PROCEDURE — 2500000003 HC RX 250 WO HCPCS: Performed by: REGISTERED NURSE

## 2023-10-17 RX ORDER — SODIUM CHLORIDE 0.9 % (FLUSH) 0.9 %
5-40 SYRINGE (ML) INJECTION PRN
Status: DISCONTINUED | OUTPATIENT
Start: 2023-10-17 | End: 2023-10-17 | Stop reason: HOSPADM

## 2023-10-17 RX ORDER — SODIUM CHLORIDE 9 MG/ML
INJECTION, SOLUTION INTRAVENOUS PRN
Status: DISCONTINUED | OUTPATIENT
Start: 2023-10-17 | End: 2023-10-17 | Stop reason: HOSPADM

## 2023-10-17 RX ORDER — GLYCOPYRROLATE 0.2 MG/ML
INJECTION INTRAMUSCULAR; INTRAVENOUS PRN
Status: DISCONTINUED | OUTPATIENT
Start: 2023-10-17 | End: 2023-10-17 | Stop reason: SDUPTHER

## 2023-10-17 RX ORDER — SODIUM CHLORIDE, SODIUM LACTATE, POTASSIUM CHLORIDE, CALCIUM CHLORIDE 600; 310; 30; 20 MG/100ML; MG/100ML; MG/100ML; MG/100ML
INJECTION, SOLUTION INTRAVENOUS CONTINUOUS
Status: CANCELLED | OUTPATIENT
Start: 2023-10-17

## 2023-10-17 RX ORDER — SODIUM CHLORIDE, SODIUM LACTATE, POTASSIUM CHLORIDE, CALCIUM CHLORIDE 600; 310; 30; 20 MG/100ML; MG/100ML; MG/100ML; MG/100ML
INJECTION, SOLUTION INTRAVENOUS CONTINUOUS
Status: DISCONTINUED | OUTPATIENT
Start: 2023-10-17 | End: 2023-10-17 | Stop reason: HOSPADM

## 2023-10-17 RX ORDER — LIDOCAINE HYDROCHLORIDE 10 MG/ML
1 INJECTION, SOLUTION INFILTRATION; PERINEURAL
Status: DISCONTINUED | OUTPATIENT
Start: 2023-10-17 | End: 2023-10-17 | Stop reason: HOSPADM

## 2023-10-17 RX ORDER — SODIUM CHLORIDE 0.9 % (FLUSH) 0.9 %
5-40 SYRINGE (ML) INJECTION EVERY 12 HOURS SCHEDULED
Status: DISCONTINUED | OUTPATIENT
Start: 2023-10-17 | End: 2023-10-17 | Stop reason: HOSPADM

## 2023-10-17 RX ORDER — PROPOFOL 10 MG/ML
INJECTION, EMULSION INTRAVENOUS PRN
Status: DISCONTINUED | OUTPATIENT
Start: 2023-10-17 | End: 2023-10-17 | Stop reason: SDUPTHER

## 2023-10-17 RX ORDER — ONDANSETRON 2 MG/ML
4 INJECTION INTRAMUSCULAR; INTRAVENOUS
Status: CANCELLED | OUTPATIENT
Start: 2023-10-17 | End: 2023-10-18

## 2023-10-17 RX ORDER — LIDOCAINE HYDROCHLORIDE 20 MG/ML
INJECTION, SOLUTION EPIDURAL; INFILTRATION; INTRACAUDAL; PERINEURAL PRN
Status: DISCONTINUED | OUTPATIENT
Start: 2023-10-17 | End: 2023-10-17 | Stop reason: SDUPTHER

## 2023-10-17 RX ADMIN — PROPOFOL 30 MG: 10 INJECTION, EMULSION INTRAVENOUS at 09:28

## 2023-10-17 RX ADMIN — SODIUM CHLORIDE, POTASSIUM CHLORIDE, SODIUM LACTATE AND CALCIUM CHLORIDE: 600; 310; 30; 20 INJECTION, SOLUTION INTRAVENOUS at 07:19

## 2023-10-17 RX ADMIN — PROPOFOL 160 MCG/KG/MIN: 10 INJECTION, EMULSION INTRAVENOUS at 09:29

## 2023-10-17 RX ADMIN — GLYCOPYRROLATE 0.1 MG: 0.2 INJECTION INTRAMUSCULAR; INTRAVENOUS at 09:30

## 2023-10-17 RX ADMIN — LIDOCAINE HYDROCHLORIDE 20 MG: 20 INJECTION, SOLUTION EPIDURAL; INFILTRATION; INTRACAUDAL; PERINEURAL at 09:28

## 2023-10-17 ASSESSMENT — LIFESTYLE VARIABLES: SMOKING_STATUS: 1

## 2023-10-17 NOTE — H&P
ENDOSCOPY H&P    Carola Lee is a 79 y.o. female who presents to the [facility]oscopy [facility] for screening. No priors. No FH    Patient's medications, allergies, past medical, surgical, social and family histories were reviewed and updated as appropriate.     Past Medical History:   Diagnosis Date    CAD (coronary artery disease)     mi 12/2021 (stents placed) and 2/2023 (severe small branch vessel disease being treated medically)--- followed by dr Anthony Barber at [facility] cardio    CHF (congestive heart failure) ([address] )     EF 45-50% PER DR Lainey Bryan NOTE in care everywhere    Diabetes mellitus ([address])     oral med-does not check sqbs at home    Hepatitis C     Hyperlipidemia     Hypertension     controlled with med    Polysubstance abuse ([facility])     per note in care everywhere tested + for cocaine and cannabinoid 2/2023--pt does admit to\" still smoking pot 3-4 times weekly\" on phone assess 10/12/23    Sleep apnea     does not use CPAP    Small vessel coronary artery disease     severe per dr Chapincito Geller note after cath 3/1/2023       Past Surgical History:   Procedure Laterality Date    CARDIAC CATHETERIZATION  03/01/2023    CARDIAC CATHETERIZATION  12/2021    FACIAL FRACTURE SURGERY  1980       Current Facility-Administered Medications: lidocaine 1 % injection 1 mL, 1 mL, IntraDERmal, Once PRN  lactated ringers IV soln infusion, , IntraVENous, Continuous  sodium chloride flush 0.9 % injection 5-40 mL, 5-40 mL, IntraVENous, 2 times per day  sodium chloride flush 0.9 % injection 5-40 mL, 5-40 mL, IntraVENous, PRN  0.9 % sodium chloride infusion, , IntraVENous, PRN  sodium chloride flush 0.9 % injection 5-40 mL, 5-40 mL, IntraVENous, 2 times per day  sodium chloride flush 0.9 % injection 5-40 mL, 5-40 mL, IntraVENous, PRN  0.9 % sodium chloride infusion, , IntraVENous, PRN    No Known Allergies     ASA 2 - Patient with mild systemic disease with no functional limitations    (I) Normal healthy patient (II) Patient with mild

## 2023-10-17 NOTE — ANESTHESIA PRE PROCEDURE
Department of Anesthesiology  Preprocedure Note       Name:  [facility]   Age:  79 y.o.  :  1955                                          MRN:  [id number]         Date:  10/17/2023      Surgeon: Urvashi Gordillo):  Jessica Germain DO    Procedure: Procedure(s):  COLORECTAL CANCER SCREENING, NOT HIGH RISK    Medications prior to admission:   Prior to Admission medications    Medication Sig Start Date End Date Taking? Authorizing Provider   Blood Glucose Monitoring Suppl (TRUE METRIX AIR GLUCOSE METER) REINALDO 1 Device by Does not apply route daily 23   Jb Lucas MD   blood glucose monitor strips Test 1 time a day & as needed for symptoms of irregular blood glucose. Dispense sufficient amount for indicated testing frequency plus additional to accommodate PRN testing needs.  23   Jb Lucas MD   TRUEplus Lancets 33G MISC 1 Device by Does not apply route daily 23   Jb Lucas MD   Alcohol Swabs (B-D SINGLE USE SWABS REGULAR) PADS 1 Device by Does not apply route daily 23   Vika Lucas III, MD   Blood Glucose Calibration (TRUE METRIX LEVEL 1) Low SOLN 1 Device by In Vitro route daily 23   Jb Lucas MD   isosorbide mononitrate (IMDUR) 30 MG extended release tablet Take 1 tablet by mouth daily    Ivone Camejo MD   aspirin 81 MG EC tablet Take 1 tablet by mouth daily    Ivone Camejo MD   clopidogrel (PLAVIX) 75 MG tablet Take 1 tablet by mouth daily 23  Jb Lucas MD   lisinopril (PRINIVIL;ZESTRIL) 2.5 MG tablet Take 1 tablet by mouth daily 23  Jb Lucas MD   nitroGLYCERIN (NITROSTAT) 0.4 MG SL tablet Place 1 tablet under the tongue every 5 minutes as needed for Chest pain 23   Jb Lucas MD   metFORMIN (GLUCOPHAGE) 500 MG tablet Take 1 tablet by mouth 2 times daily (with meals) 9/27/22 10/17/23  Jb Lucas MD   atorvastatin (LIPITOR)

## 2023-10-17 NOTE — PROGRESS NOTES
VSS. Discharge instructions reviewed with patient and son and copy of instructions sent home with patient. Questions answered. Patient transferred out via wheelchair by endo staff. IV discontinued prior to discharge.

## 2023-10-17 NOTE — PROGRESS NOTES
's pre-procedural visit requested by patient. Conveyed care and concern for patient and family. Offered prayer as requested for patient, family, and staff.     Eneida Bolaños MDiv, BS  Board Certified

## 2023-10-17 NOTE — OP NOTE
COLONOSCOPY PROCEDURE NOTE    Name:  Meek Wright : 1955  Date/Time of Admission: 10/17/2023  6:37 AM   MRN: [id number]:[id number] Attending Provider: Melissa Jin,*  Room/Bed:  ENDO/PL PCP: Eda Matias MD    Indications: The patient is being evaluated for screening    The patient was given informed consent with the indications and complications of the procedure. Consent form was signed. Procedure: Colonoscopy, hot snare polypectomy x2, hot forcep polypectomy     Pre-operative Diagnosis: screening    Post-operative Diagnosis: transverse polyp  x3, sigmoid diverticulosis    Surgeon: Melissa Jin,*    Anesthesia: MAC    Procedure: Informed consent was obtained for the procedure, including sedation. Risks of perforation, hemorrhage, adverse drug reaction and aspiration were discussed. The patient was placed in the left lateral decubitus position. Based on the pre-procedure assessment, including review of the patient's medical history, medications, allergies, and review of systems, Patient had been deemed to be an appropriate candidate for conscious sedation; Patient was therefore sedated by anesthesia. The patient was monitored continuously with ECG tracing, pulse oximetry, blood pressure monitoring, and direct observations. Once adequately sedated a digital rectal exam was performed and was unremarkable. The lubricated flexible fiberoptic colonoscope was then inserted through the anus into the rectum. Under direct visualization with the use of air insufflation, the scope was advanced throughout the entire length of the colon to the cecum. The cecum was identified via visualization of the appendiceal orifice and ileocecal valve. No masses or polyps were identified. Bowel prep was good. Mild sigmoid diverticulosis. 3 sessile polyps of the transverse colon, 2 removed with hot snare, one with hot forceps.   The remainder of the

## 2023-10-18 ENCOUNTER — CLINICAL DOCUMENTATION (OUTPATIENT)
Dept: SURGERY | Age: 68
End: 2023-10-18

## 2023-10-18 NOTE — PROGRESS NOTES
Attempted to call patient about her colonoscopy results. She had 3 polyps that were tubular adenomas that she will need to repeat a colonoscopy in 3 years. I called her twice and was unable to get a hold of her and she does not have a voicemail box that is set up. I did update her health maintenance to notify us in 3 years.

## 2023-11-15 PROBLEM — Z12.11 SPECIAL SCREENING FOR MALIGNANT NEOPLASMS, COLON: Status: RESOLVED | Noted: 2023-05-25 | Resolved: 2023-11-15

## 2023-11-22 ENCOUNTER — OFFICE VISIT (OUTPATIENT)
Dept: FAMILY MEDICINE CLINIC | Facility: CLINIC | Age: 68
End: 2023-11-22
Payer: MEDICARE

## 2023-11-22 VITALS
HEIGHT: 69 IN | BODY MASS INDEX: 14.22 KG/M2 | SYSTOLIC BLOOD PRESSURE: 130 MMHG | DIASTOLIC BLOOD PRESSURE: 80 MMHG | WEIGHT: 96 LBS

## 2023-11-22 DIAGNOSIS — E11.65 TYPE 2 DIABETES MELLITUS WITH HYPERGLYCEMIA, WITHOUT LONG-TERM CURRENT USE OF INSULIN (HCC): ICD-10-CM

## 2023-11-22 DIAGNOSIS — I50.22 CHRONIC SYSTOLIC CONGESTIVE HEART FAILURE (HCC): ICD-10-CM

## 2023-11-22 DIAGNOSIS — Z78.0 MENOPAUSE: ICD-10-CM

## 2023-11-22 DIAGNOSIS — E78.5 DYSLIPIDEMIA: ICD-10-CM

## 2023-11-22 DIAGNOSIS — Z00.00 MEDICARE ANNUAL WELLNESS VISIT, SUBSEQUENT: Primary | ICD-10-CM

## 2023-11-22 DIAGNOSIS — Z23 FLU VACCINE NEED: ICD-10-CM

## 2023-11-22 DIAGNOSIS — R09.89 WEAK ARTERIAL PULSE: ICD-10-CM

## 2023-11-22 DIAGNOSIS — J30.2 SEASONAL ALLERGIC RHINITIS, UNSPECIFIED TRIGGER: ICD-10-CM

## 2023-11-22 DIAGNOSIS — I25.10 CORONARY ARTERY DISEASE INVOLVING NATIVE CORONARY ARTERY OF NATIVE HEART WITHOUT ANGINA PECTORIS: ICD-10-CM

## 2023-11-22 DIAGNOSIS — Z28.39 IMMUNIZATION DEFICIENCY: ICD-10-CM

## 2023-11-22 DIAGNOSIS — R05.1 ACUTE COUGH: ICD-10-CM

## 2023-11-22 DIAGNOSIS — Z12.31 ENCOUNTER FOR SCREENING MAMMOGRAM FOR MALIGNANT NEOPLASM OF BREAST: ICD-10-CM

## 2023-11-22 LAB
INFLUENZA A ANTIGEN, POC: NEGATIVE
INFLUENZA B ANTIGEN, POC: NEGATIVE
LOT EXPIRE DATE: NORMAL
LOT KIT NUMBER: NORMAL
SARS-COV-2 RNA, POC: NEGATIVE
VALID INTERNAL CONTROL: YES
VENDOR AND KIT NAME POC: NORMAL

## 2023-11-22 PROCEDURE — 87428 SARSCOV & INF VIR A&B AG IA: CPT | Performed by: FAMILY MEDICINE

## 2023-11-22 PROCEDURE — 3017F COLORECTAL CA SCREEN DOC REV: CPT | Performed by: FAMILY MEDICINE

## 2023-11-22 PROCEDURE — 1090F PRES/ABSN URINE INCON ASSESS: CPT | Performed by: FAMILY MEDICINE

## 2023-11-22 PROCEDURE — G0008 ADMIN INFLUENZA VIRUS VAC: HCPCS | Performed by: FAMILY MEDICINE

## 2023-11-22 PROCEDURE — G0439 PPPS, SUBSEQ VISIT: HCPCS | Performed by: FAMILY MEDICINE

## 2023-11-22 PROCEDURE — G8419 CALC BMI OUT NRM PARAM NOF/U: HCPCS | Performed by: FAMILY MEDICINE

## 2023-11-22 PROCEDURE — 3044F HG A1C LEVEL LT 7.0%: CPT | Performed by: FAMILY MEDICINE

## 2023-11-22 PROCEDURE — 99214 OFFICE O/P EST MOD 30 MIN: CPT | Performed by: FAMILY MEDICINE

## 2023-11-22 PROCEDURE — 1123F ACP DISCUSS/DSCN MKR DOCD: CPT | Performed by: FAMILY MEDICINE

## 2023-11-22 PROCEDURE — 2022F DILAT RTA XM EVC RTNOPTHY: CPT | Performed by: FAMILY MEDICINE

## 2023-11-22 PROCEDURE — G8400 PT W/DXA NO RESULTS DOC: HCPCS | Performed by: FAMILY MEDICINE

## 2023-11-22 PROCEDURE — 90694 VACC AIIV4 NO PRSRV 0.5ML IM: CPT | Performed by: FAMILY MEDICINE

## 2023-11-22 PROCEDURE — G8484 FLU IMMUNIZE NO ADMIN: HCPCS | Performed by: FAMILY MEDICINE

## 2023-11-22 PROCEDURE — G8427 DOCREV CUR MEDS BY ELIG CLIN: HCPCS | Performed by: FAMILY MEDICINE

## 2023-11-22 PROCEDURE — 4004F PT TOBACCO SCREEN RCVD TLK: CPT | Performed by: FAMILY MEDICINE

## 2023-11-22 RX ORDER — AZELASTINE 1 MG/ML
1 SPRAY, METERED NASAL 2 TIMES DAILY
Qty: 60 ML | Refills: 1 | Status: SHIPPED | OUTPATIENT
Start: 2023-11-22

## 2023-11-22 ASSESSMENT — ENCOUNTER SYMPTOMS
SINUS PAIN: 0
FACIAL SWELLING: 0
DIARRHEA: 0
COUGH: 1
RHINORRHEA: 1
EYE PAIN: 0
WHEEZING: 0
EYE ITCHING: 0
EYE REDNESS: 0
SHORTNESS OF BREATH: 0
COLOR CHANGE: 0
STRIDOR: 0
ABDOMINAL DISTENTION: 0
SORE THROAT: 0
CONSTIPATION: 0
CHEST TIGHTNESS: 0
SINUS PRESSURE: 0
VOMITING: 0
BACK PAIN: 0
ABDOMINAL PAIN: 0
NAUSEA: 0
BLOOD IN STOOL: 0
EYE DISCHARGE: 0

## 2023-11-22 ASSESSMENT — LIFESTYLE VARIABLES
HOW OFTEN DURING THE LAST YEAR HAVE YOU NEEDED AN ALCOHOLIC DRINK FIRST THING IN THE MORNING TO GET YOURSELF GOING AFTER A NIGHT OF HEAVY DRINKING: 0
HOW OFTEN DO YOU HAVE A DRINK CONTAINING ALCOHOL: 2-4 TIMES A MONTH
HAVE YOU OR SOMEONE ELSE BEEN INJURED AS A RESULT OF YOUR DRINKING: 0
HOW OFTEN DURING THE LAST YEAR HAVE YOU HAD A FEELING OF GUILT OR REMORSE AFTER DRINKING: 0
HOW OFTEN DURING THE LAST YEAR HAVE YOU FAILED TO DO WHAT WAS NORMALLY EXPECTED FROM YOU BECAUSE OF DRINKING: 0
HAS A RELATIVE, FRIEND, DOCTOR, OR ANOTHER HEALTH PROFESSIONAL EXPRESSED CONCERN ABOUT YOUR DRINKING OR SUGGESTED YOU CUT DOWN: 0
HOW OFTEN DURING THE LAST YEAR HAVE YOU BEEN UNABLE TO REMEMBER WHAT HAPPENED THE NIGHT BEFORE BECAUSE YOU HAD BEEN DRINKING: 0
HOW OFTEN DURING THE LAST YEAR HAVE YOU FOUND THAT YOU WERE NOT ABLE TO STOP DRINKING ONCE YOU HAD STARTED: 0
HOW MANY STANDARD DRINKS CONTAINING ALCOHOL DO YOU HAVE ON A TYPICAL DAY: 1 OR 2

## 2023-11-22 ASSESSMENT — PATIENT HEALTH QUESTIONNAIRE - PHQ9
SUM OF ALL RESPONSES TO PHQ QUESTIONS 1-9: 0
1. LITTLE INTEREST OR PLEASURE IN DOING THINGS: 0
SUM OF ALL RESPONSES TO PHQ QUESTIONS 1-9: 0
2. FEELING DOWN, DEPRESSED OR HOPELESS: 0
SUM OF ALL RESPONSES TO PHQ QUESTIONS 1-9: 0
SUM OF ALL RESPONSES TO PHQ QUESTIONS 1-9: 0
SUM OF ALL RESPONSES TO PHQ9 QUESTIONS 1 & 2: 0

## 2023-11-22 NOTE — ASSESSMENT & PLAN NOTE
Likely allergic. Start intranasal AH. Instructed to RTC with sob, fever, chills, increasing sputum production.

## 2023-11-22 NOTE — PROGRESS NOTES
medically)--- followed by dr Katherine Villarreal at [facility] cardio    CHF (congestive heart failure) ([address])     EF 45-50% PER DR SCHNEIDER NOTE in care everywhere    Diabetes mellitus ([address])     oral med-does not check sqbs at home    Hepatitis C     Hyperlipidemia     Hypertension     controlled with med    Polysubstance abuse ([address])     per note in care everywhere tested + for cocaine and cannabinoid 2/2023--pt does admit to\" still smoking pot 3-4 times weekly\" on phone assess 10/12/23    Sleep apnea     does not use CPAP    Small vessel coronary artery disease     severe per dr Vida Mars note after cath 3/1/2023       Past Surgical History:   Procedure Laterality Date    CARDIAC CATHETERIZATION  03/01/2023    CARDIAC CATHETERIZATION  12/2021    COLONOSCOPY N/A 10/17/2023    COLONOSCOPY, POLYPECTOMY performed by Vikram Navarro DO at [address] [place] [address]       Family History   Problem Relation Age of Onset    Diabetes Mother     Heart Disease Father     Cancer Father        Social History     Tobacco Use    Smoking status: Every Day     Packs/day: 0.25     Years: 52.00     Additional pack years: 0.00     Total pack years: 13.00     Types: Cigarettes    Smokeless tobacco: Never   Substance Use Topics    Alcohol use: Yes     Comment: occ       No Known Allergies    Current Outpatient Medications   Medication Sig Dispense Refill    pneumococcal 20-valent conjugat (PREVNAR) 0.5 ML ZAIN inj Inject 0.5 mLs into the muscle once for 1 dose 0.5 mL 0    azelastine (ASTELIN) 0.1 % nasal spray 1 spray by Nasal route 2 times daily Use in each nostril as directed 60 mL 1    Blood Glucose Monitoring Suppl (TRUE METRIX AIR GLUCOSE METER) REINALDO 1 Device by Does not apply route daily 1 each 0    blood glucose monitor strips Test 1 time a day & as needed for symptoms of irregular blood glucose. Dispense sufficient amount for indicated testing frequency plus additional to accommodate PRN testing needs.  300 strip 5

## 2023-11-22 NOTE — ASSESSMENT & PLAN NOTE
F/u a1c. Continue metformin. Encouraged to control intake of sugar, bread, pasta, rice, potatoes, fruit, snack foods, desserts in diet. Discussed importance of daily foot check to ensure no lesion and if any wounds noted, RTC for evaluation ASAP. Instructed to refrain from walking with open toed shoes, especially outdoors and wear foot protection even in house. Encouraged to refrain from trimming toe nails short and if unable to easily trim without injury, we will schedule podiatry referral. Recommended daily BG checks for goal of <120 fasting and <180 PP. Instructed to check BG if s/s of hypoglycemia occur-shaking, mental fog, cold sweats-and if < 70, eat cracker with peanut butter and glass of milk and recheck BG in 1 hr.

## 2023-11-22 NOTE — ASSESSMENT & PLAN NOTE
Encouraged 5 servings of fruits and veggies daily. Instructed to drink approx 2 L of fluid daily, mostly water. Recommended 30 sustained minutes of aerobic exercise daily (e.g. cycling, rowing, jogging). Limit high calorie processed foods, red meat, egg yolks <1 daily, dairy products, butter, jacobs, fried and fast foods. Immunizations:  -Influenza: Recommended annually- given today  -Covid- Advised on CDC recommendations. recommended  -PNA- Discussed at risk populations, s/e vs benefits.  Order sent  -Shingles- Counseled on shingles vaccine- refuses  Tdap- declines    Screenings:  -Colonoscopy-  UTD  -mammogram- ordered  - DEXA- orderd

## 2023-11-23 LAB
CREAT UR-MCNC: 131 MG/DL
EST. AVERAGE GLUCOSE BLD GHB EST-MCNC: 137 MG/DL
HBA1C MFR BLD: 6.4 % (ref 4.8–5.6)
MICROALBUMIN UR-MCNC: 2.03 MG/DL
MICROALBUMIN/CREAT UR-RTO: 15 MG/G (ref 0–30)

## 2023-12-22 PROBLEM — Z00.00 MEDICARE ANNUAL WELLNESS VISIT, SUBSEQUENT: Status: RESOLVED | Noted: 2023-05-25 | Resolved: 2023-12-22

## 2024-02-07 RX ORDER — GLUCOSAM/CHON-MSM1/C/MANG/BOSW 500-416.6
TABLET ORAL
Qty: 200 EACH | Refills: 3 | Status: SHIPPED | OUTPATIENT
Start: 2024-02-07

## 2024-02-07 RX ORDER — ISOPROPYL ALCOHOL 70 ML/100ML
SWAB TOPICAL
Qty: 200 EACH | Refills: 3 | Status: SHIPPED | OUTPATIENT
Start: 2024-02-07

## 2024-03-04 ENCOUNTER — OFFICE VISIT (OUTPATIENT)
Dept: FAMILY MEDICINE CLINIC | Facility: CLINIC | Age: 69
End: 2024-03-04
Payer: MEDICARE

## 2024-03-04 VITALS
HEIGHT: 69 IN | BODY MASS INDEX: 14.04 KG/M2 | WEIGHT: 94.8 LBS | DIASTOLIC BLOOD PRESSURE: 68 MMHG | SYSTOLIC BLOOD PRESSURE: 98 MMHG

## 2024-03-04 DIAGNOSIS — F17.200 SMOKER: ICD-10-CM

## 2024-03-04 DIAGNOSIS — E11.65 TYPE 2 DIABETES MELLITUS WITH HYPERGLYCEMIA, WITHOUT LONG-TERM CURRENT USE OF INSULIN (HCC): ICD-10-CM

## 2024-03-04 DIAGNOSIS — I50.22 CHRONIC SYSTOLIC CONGESTIVE HEART FAILURE (HCC): ICD-10-CM

## 2024-03-04 DIAGNOSIS — I20.9 ANGINA PECTORIS, UNSPECIFIED (HCC): ICD-10-CM

## 2024-03-04 DIAGNOSIS — Z00.00 MEDICARE ANNUAL WELLNESS VISIT, SUBSEQUENT: Primary | ICD-10-CM

## 2024-03-04 DIAGNOSIS — I25.119 ATHEROSCLEROSIS OF NATIVE CORONARY ARTERY OF NATIVE HEART WITH ANGINA PECTORIS (HCC): ICD-10-CM

## 2024-03-04 DIAGNOSIS — Z00.00 ANNUAL PHYSICAL EXAM: ICD-10-CM

## 2024-03-04 DIAGNOSIS — I25.10 CORONARY ARTERY DISEASE INVOLVING NATIVE CORONARY ARTERY OF NATIVE HEART WITHOUT ANGINA PECTORIS: ICD-10-CM

## 2024-03-04 DIAGNOSIS — Z28.39 IMMUNIZATION DEFICIENCY: ICD-10-CM

## 2024-03-04 DIAGNOSIS — E78.5 DYSLIPIDEMIA: ICD-10-CM

## 2024-03-04 LAB
ALBUMIN SERPL-MCNC: 3.7 G/DL (ref 3.2–4.6)
ALBUMIN/GLOB SERPL: 1.1 (ref 0.4–1.6)
ALP SERPL-CCNC: 87 U/L (ref 50–136)
ALT SERPL-CCNC: 91 U/L (ref 12–65)
ANION GAP SERPL CALC-SCNC: 8 MMOL/L (ref 2–11)
AST SERPL-CCNC: 109 U/L (ref 15–37)
BASOPHILS # BLD: 0 K/UL (ref 0–0.2)
BASOPHILS NFR BLD: 0 % (ref 0–2)
BILIRUB SERPL-MCNC: 0.8 MG/DL (ref 0.2–1.1)
BUN SERPL-MCNC: 20 MG/DL (ref 8–23)
CALCIUM SERPL-MCNC: 9.6 MG/DL (ref 8.3–10.4)
CHLORIDE SERPL-SCNC: 103 MMOL/L (ref 103–113)
CHOLEST SERPL-MCNC: 130 MG/DL
CO2 SERPL-SCNC: 29 MMOL/L (ref 21–32)
CREAT SERPL-MCNC: 1.1 MG/DL (ref 0.6–1)
DIFFERENTIAL METHOD BLD: ABNORMAL
EOSINOPHIL # BLD: 0 K/UL (ref 0–0.8)
EOSINOPHIL NFR BLD: 1 % (ref 0.5–7.8)
ERYTHROCYTE [DISTWIDTH] IN BLOOD BY AUTOMATED COUNT: 14.8 % (ref 11.9–14.6)
EST. AVERAGE GLUCOSE BLD GHB EST-MCNC: 143 MG/DL
GLOBULIN SER CALC-MCNC: 3.3 G/DL (ref 2.8–4.5)
GLUCOSE SERPL-MCNC: 357 MG/DL (ref 65–100)
HBA1C MFR BLD: 6.6 % (ref 4.8–5.6)
HCT VFR BLD AUTO: 38.3 % (ref 35.8–46.3)
HDLC SERPL-MCNC: 75 MG/DL (ref 40–60)
HDLC SERPL: 1.7
HGB BLD-MCNC: 12 G/DL (ref 11.7–15.4)
IMM GRANULOCYTES # BLD AUTO: 0 K/UL (ref 0–0.5)
IMM GRANULOCYTES NFR BLD AUTO: 0 % (ref 0–5)
LDLC SERPL CALC-MCNC: 39 MG/DL
LYMPHOCYTES # BLD: 2.7 K/UL (ref 0.5–4.6)
LYMPHOCYTES NFR BLD: 59 % (ref 13–44)
MCH RBC QN AUTO: 27.3 PG (ref 26.1–32.9)
MCHC RBC AUTO-ENTMCNC: 31.3 G/DL (ref 31.4–35)
MCV RBC AUTO: 87 FL (ref 82–102)
MONOCYTES # BLD: 0.6 K/UL (ref 0.1–1.3)
MONOCYTES NFR BLD: 14 % (ref 4–12)
NEUTS SEG # BLD: 1.2 K/UL (ref 1.7–8.2)
NEUTS SEG NFR BLD: 26 % (ref 43–78)
NRBC # BLD: 0 K/UL (ref 0–0.2)
PLATELET # BLD AUTO: 225 K/UL (ref 150–450)
PMV BLD AUTO: 10.9 FL (ref 9.4–12.3)
POTASSIUM SERPL-SCNC: 4.1 MMOL/L (ref 3.5–5.1)
PROT SERPL-MCNC: 7 G/DL (ref 6.3–8.2)
RBC # BLD AUTO: 4.4 M/UL (ref 4.05–5.2)
SODIUM SERPL-SCNC: 140 MMOL/L (ref 136–146)
TRIGL SERPL-MCNC: 80 MG/DL (ref 35–150)
TSH, 3RD GENERATION: 1.18 UIU/ML (ref 0.36–3.74)
VLDLC SERPL CALC-MCNC: 16 MG/DL (ref 6–23)
WBC # BLD AUTO: 4.7 K/UL (ref 4.3–11.1)

## 2024-03-04 PROCEDURE — G8427 DOCREV CUR MEDS BY ELIG CLIN: HCPCS | Performed by: FAMILY MEDICINE

## 2024-03-04 PROCEDURE — G8419 CALC BMI OUT NRM PARAM NOF/U: HCPCS | Performed by: FAMILY MEDICINE

## 2024-03-04 PROCEDURE — 4004F PT TOBACCO SCREEN RCVD TLK: CPT | Performed by: FAMILY MEDICINE

## 2024-03-04 PROCEDURE — 2022F DILAT RTA XM EVC RTNOPTHY: CPT | Performed by: FAMILY MEDICINE

## 2024-03-04 PROCEDURE — 1090F PRES/ABSN URINE INCON ASSESS: CPT | Performed by: FAMILY MEDICINE

## 2024-03-04 PROCEDURE — G8484 FLU IMMUNIZE NO ADMIN: HCPCS | Performed by: FAMILY MEDICINE

## 2024-03-04 PROCEDURE — 99214 OFFICE O/P EST MOD 30 MIN: CPT | Performed by: FAMILY MEDICINE

## 2024-03-04 PROCEDURE — 3046F HEMOGLOBIN A1C LEVEL >9.0%: CPT | Performed by: FAMILY MEDICINE

## 2024-03-04 PROCEDURE — G8400 PT W/DXA NO RESULTS DOC: HCPCS | Performed by: FAMILY MEDICINE

## 2024-03-04 PROCEDURE — G0439 PPPS, SUBSEQ VISIT: HCPCS | Performed by: FAMILY MEDICINE

## 2024-03-04 PROCEDURE — 3017F COLORECTAL CA SCREEN DOC REV: CPT | Performed by: FAMILY MEDICINE

## 2024-03-04 PROCEDURE — 1123F ACP DISCUSS/DSCN MKR DOCD: CPT | Performed by: FAMILY MEDICINE

## 2024-03-04 ASSESSMENT — PATIENT HEALTH QUESTIONNAIRE - PHQ9
SUM OF ALL RESPONSES TO PHQ QUESTIONS 1-9: 0
2. FEELING DOWN, DEPRESSED OR HOPELESS: 0
1. LITTLE INTEREST OR PLEASURE IN DOING THINGS: 0
SUM OF ALL RESPONSES TO PHQ QUESTIONS 1-9: 0
SUM OF ALL RESPONSES TO PHQ9 QUESTIONS 1 & 2: 0
SUM OF ALL RESPONSES TO PHQ QUESTIONS 1-9: 0
SUM OF ALL RESPONSES TO PHQ QUESTIONS 1-9: 0

## 2024-03-04 ASSESSMENT — LIFESTYLE VARIABLES
HAVE YOU OR SOMEONE ELSE BEEN INJURED AS A RESULT OF YOUR DRINKING: 0
HOW OFTEN DURING THE LAST YEAR HAVE YOU HAD A FEELING OF GUILT OR REMORSE AFTER DRINKING: 0
HOW OFTEN DURING THE LAST YEAR HAVE YOU BEEN UNABLE TO REMEMBER WHAT HAPPENED THE NIGHT BEFORE BECAUSE YOU HAD BEEN DRINKING: 0
HOW OFTEN DURING THE LAST YEAR HAVE YOU FAILED TO DO WHAT WAS NORMALLY EXPECTED FROM YOU BECAUSE OF DRINKING: 0
HAS A RELATIVE, FRIEND, DOCTOR, OR ANOTHER HEALTH PROFESSIONAL EXPRESSED CONCERN ABOUT YOUR DRINKING OR SUGGESTED YOU CUT DOWN: 0
HOW OFTEN DURING THE LAST YEAR HAVE YOU FOUND THAT YOU WERE NOT ABLE TO STOP DRINKING ONCE YOU HAD STARTED: 0
HOW MANY STANDARD DRINKS CONTAINING ALCOHOL DO YOU HAVE ON A TYPICAL DAY: 3 OR 4
HOW OFTEN DO YOU HAVE A DRINK CONTAINING ALCOHOL: 2-4 TIMES A MONTH
HOW OFTEN DURING THE LAST YEAR HAVE YOU NEEDED AN ALCOHOLIC DRINK FIRST THING IN THE MORNING TO GET YOURSELF GOING AFTER A NIGHT OF HEAVY DRINKING: 0

## 2024-03-04 ASSESSMENT — ENCOUNTER SYMPTOMS
SHORTNESS OF BREATH: 0
EYE PAIN: 0
SINUS PAIN: 0
SINUS PRESSURE: 0
DIARRHEA: 0
CONSTIPATION: 0
EYE DISCHARGE: 0
ABDOMINAL DISTENTION: 0
EYE ITCHING: 0
STRIDOR: 0
BACK PAIN: 0
NAUSEA: 0
WHEEZING: 0
EYE REDNESS: 0
RHINORRHEA: 0
COLOR CHANGE: 0
VOMITING: 0
FACIAL SWELLING: 0
ABDOMINAL PAIN: 0
CHEST TIGHTNESS: 0
SORE THROAT: 0
BLOOD IN STOOL: 0
COUGH: 0

## 2024-03-04 NOTE — ASSESSMENT & PLAN NOTE
Encouraged compliance with medications. Stressed importance of low sodium and low fat diet (limit processed foods, fast foods and avoid adding salt to cooked food), reduce red meat, butter, mayonnaise, egg yolks [<1 egg yolk daily] and dairy products, increase daily exercise [at least 30 sustained minutes 3-5 times weekly])

## 2024-03-04 NOTE — ASSESSMENT & PLAN NOTE
Encouraged 5 servings of fruits and veggies daily. Instructed to drink approx 2 L of fluid daily, mostly water. Recommended 30 sustained minutes of aerobic exercise daily (e.g. cycling, rowing, jogging). Limit high calorie processed foods, red meat, egg yolks <1 daily, dairy products, butter, jacobs, fried and fast foods.     Immunizations:  -Influenza: Recommended annually- Declines  -Covid- Advised on CDC recommendations. Declines  -PNA- Discussed at risk populations, s/e vs benefits. Agrees to prevnar. Rx sent  -Shingles- Counseled on shingles vaccine-pt declines  Tdap-  declines    Screenings:  -Colonoscopy-  UTD  -mammogram- order in place. Encouraged to comply

## 2024-03-04 NOTE — ASSESSMENT & PLAN NOTE
Needs home monitoring education. Would be better to do with her monitor. RTC for such. F/u A1c. Continue current meds.

## 2024-03-04 NOTE — PROGRESS NOTES
[facility]  _______________________________________  Kwasi Madrid MD                 [address] [place] [address]        Ashok Lucas MD                     [place] [address]                                                                                    Phone: [phone]                                                                                    Fax: [phone]    Aide Aviles is a 68 y.o. female who is seen for evaluation of   Chief Complaint   Patient presents with    Medicare AWV       HPI:   Aide is a 66 y/o F with h/o CAD, s/p MI with PCI, chronic hep C, pre-diabetes, dyslipidemia, polysubstance abuse, here for f/u and AWV.     - CAD- ST elevation MI 12/16/2021. mid LAD with successful PCI and 2 overlapping drug-eluting stents. Had NSTEMI in 2/2023. She underwent LHC on 3/1 with severe small branch vessel disease. Cards felt this was best treated medically. She was positive for cocaine and cannabinoid on urine drug screen. Apparently she had not been compliant with DAPT, statin prior to event.     - dyslipidemia- Last LDL 72 on lipitor. Mostly compliant with diet.     - hep C- Pt states that she was diagnosed at [facility] a number of years ago, but never saw GI.    - type 2 DM- Pt denies any limitations to diet. Her A1c was 6.6<--6.7. She is on metformin 500mg BID.     - Sodium 146 on last visit. Pt indicates that she does not drink much water.     - Her hgb was 11.5 on last visit. She denies any bleeding episodes. There is no available results from her last c-scope, but pt denies any BRBPR.         Review of Systems:  Review of Systems   Constitutional:  Negative for activity change, appetite change, chills, diaphoresis, fatigue, fever and unexpected weight change.   HENT:  Negative for congestion, ear discharge, ear pain, facial swelling, hearing loss, mouth sores, nosebleeds, postnasal drip, rhinorrhea, sinus pressure, sinus pain, sore throat and tinnitus.

## 2024-04-03 PROBLEM — Z00.00 MEDICARE ANNUAL WELLNESS VISIT, SUBSEQUENT: Status: RESOLVED | Noted: 2024-03-04 | Resolved: 2024-04-03

## 2024-06-13 ENCOUNTER — OFFICE VISIT (OUTPATIENT)
Dept: FAMILY MEDICINE CLINIC | Facility: CLINIC | Age: 69
End: 2024-06-13
Payer: MEDICARE

## 2024-06-13 VITALS
DIASTOLIC BLOOD PRESSURE: 78 MMHG | HEIGHT: 69 IN | WEIGHT: 95 LBS | BODY MASS INDEX: 14.07 KG/M2 | SYSTOLIC BLOOD PRESSURE: 128 MMHG

## 2024-06-13 DIAGNOSIS — I25.119 ATHEROSCLEROSIS OF NATIVE CORONARY ARTERY OF NATIVE HEART WITH ANGINA PECTORIS (HCC): ICD-10-CM

## 2024-06-13 DIAGNOSIS — E11.65 TYPE 2 DIABETES MELLITUS WITH HYPERGLYCEMIA, WITHOUT LONG-TERM CURRENT USE OF INSULIN (HCC): ICD-10-CM

## 2024-06-13 DIAGNOSIS — E78.5 DYSLIPIDEMIA: ICD-10-CM

## 2024-06-13 DIAGNOSIS — I50.22 CHRONIC SYSTOLIC CONGESTIVE HEART FAILURE (HCC): Primary | ICD-10-CM

## 2024-06-13 DIAGNOSIS — R05.8 ALLERGIC COUGH: ICD-10-CM

## 2024-06-13 DIAGNOSIS — N17.9 AKI (ACUTE KIDNEY INJURY) (HCC): ICD-10-CM

## 2024-06-13 DIAGNOSIS — F17.200 SMOKER: ICD-10-CM

## 2024-06-13 PROCEDURE — G8427 DOCREV CUR MEDS BY ELIG CLIN: HCPCS | Performed by: FAMILY MEDICINE

## 2024-06-13 PROCEDURE — 99214 OFFICE O/P EST MOD 30 MIN: CPT | Performed by: FAMILY MEDICINE

## 2024-06-13 PROCEDURE — 3017F COLORECTAL CA SCREEN DOC REV: CPT | Performed by: FAMILY MEDICINE

## 2024-06-13 PROCEDURE — 4004F PT TOBACCO SCREEN RCVD TLK: CPT | Performed by: FAMILY MEDICINE

## 2024-06-13 PROCEDURE — 2022F DILAT RTA XM EVC RTNOPTHY: CPT | Performed by: FAMILY MEDICINE

## 2024-06-13 PROCEDURE — 3044F HG A1C LEVEL LT 7.0%: CPT | Performed by: FAMILY MEDICINE

## 2024-06-13 PROCEDURE — 1090F PRES/ABSN URINE INCON ASSESS: CPT | Performed by: FAMILY MEDICINE

## 2024-06-13 PROCEDURE — 1123F ACP DISCUSS/DSCN MKR DOCD: CPT | Performed by: FAMILY MEDICINE

## 2024-06-13 PROCEDURE — G8400 PT W/DXA NO RESULTS DOC: HCPCS | Performed by: FAMILY MEDICINE

## 2024-06-13 PROCEDURE — G8419 CALC BMI OUT NRM PARAM NOF/U: HCPCS | Performed by: FAMILY MEDICINE

## 2024-06-13 RX ORDER — VARENICLINE TARTRATE 1 MG/1
1 TABLET, FILM COATED ORAL 2 TIMES DAILY
Qty: 60 TABLET | Refills: 3 | Status: SHIPPED | OUTPATIENT
Start: 2024-07-11

## 2024-06-13 RX ORDER — VARENICLINE TARTRATE 0.5 MG/1
.5-1 TABLET, FILM COATED ORAL SEE ADMIN INSTRUCTIONS
Qty: 57 TABLET | Refills: 0 | Status: SHIPPED | OUTPATIENT
Start: 2024-06-13

## 2024-06-13 ASSESSMENT — ENCOUNTER SYMPTOMS
BACK PAIN: 0
SINUS PRESSURE: 0
ABDOMINAL PAIN: 0
CONSTIPATION: 0
SHORTNESS OF BREATH: 0
COLOR CHANGE: 0
FACIAL SWELLING: 0
RHINORRHEA: 0
BLOOD IN STOOL: 0
WHEEZING: 0
SORE THROAT: 0
COUGH: 0
DIARRHEA: 0
NAUSEA: 0
EYE PAIN: 0
EYE DISCHARGE: 0
SINUS PAIN: 0
CHEST TIGHTNESS: 0
STRIDOR: 0
EYE REDNESS: 0
EYE ITCHING: 0
VOMITING: 0
ABDOMINAL DISTENTION: 0

## 2024-06-13 NOTE — ASSESSMENT & PLAN NOTE
[facility] today. States she has transient ankle edema at times. None on exam today. Instructed to limit sodium. Continue lisinopril.

## 2024-06-13 NOTE — PROGRESS NOTES
hyperglycemia, without long-term current use of insulin (HCC)     A1c is good. Counseled on dietary restrictions. Continue metformin. Encouraged home testing though pt refuses. Educated on hypoglycemia s/s and tx. Notify me with any hypo readings.          Relevant Medications    metFORMIN (GLUCOPHAGE) 500 MG tablet       Genitourinary    KARY (acute kidney injury) (HCC)      Counseled on adequate fluid daily and avoiding nsaids. Previous gfr normal. Will continue monitoring.             Other    Dyslipidemia     LDL at goal on statin. Continue same dose. Encouraged to reduce red meat, fried foods, fast foods, butter, mayonnaise and dairy products; increase daily exercise and limit intake of egg yolks to < 7 egg yolks weekly.            Smoker      Agrees to trial on chantix. S/e and taper schedule explained.          Relevant Medications    varenicline (CHANTIX) 0.5 MG tablet    varenicline (CHANTIX CONTINUING MONTH PAK) 1 MG tablet (Start on 7/11/2024)        Ashok Lucas III, MD

## 2024-06-13 NOTE — ASSESSMENT & PLAN NOTE
LDL at goal on statin. Continue same dose. Encouraged to reduce red meat, fried foods, fast foods, butter, mayonnaise and dairy products; increase daily exercise and limit intake of egg yolks to < 7 egg yolks weekly.

## 2024-06-13 NOTE — ASSESSMENT & PLAN NOTE
Encouraged compliance with medications-coreg, statin, imdur, ntg. Stressed importance of low sodium and low fat diet (limit processed foods, fast foods and avoid adding salt to cooked food), reduce red meat, butter, mayonnaise, egg yolks [<1 egg yolk daily] and dairy products, increase daily exercise [at least 30 sustained minutes 3-5 times weekly]). Congratulated on agreement to smoking cessation.

## 2024-06-13 NOTE — ASSESSMENT & PLAN NOTE
Counseled on adequate fluid daily and avoiding nsaids. Previous gfr normal. Will continue monitoring.

## 2024-06-13 NOTE — ASSESSMENT & PLAN NOTE
A1c is good. Counseled on dietary restrictions. Continue metformin. Encouraged home testing though pt refuses. Educated on hypoglycemia s/s and tx. Notify me with any hypo readings.

## 2024-06-27 DIAGNOSIS — Z95.5 HISTORY OF HEART ARTERY STENT: ICD-10-CM

## 2024-06-27 DIAGNOSIS — I50.22 CHRONIC SYSTOLIC CONGESTIVE HEART FAILURE (HCC): ICD-10-CM

## 2024-06-27 DIAGNOSIS — R73.03 PRE-DIABETES: ICD-10-CM

## 2024-06-27 DIAGNOSIS — I25.10 CORONARY ARTERY DISEASE INVOLVING NATIVE CORONARY ARTERY OF NATIVE HEART WITHOUT ANGINA PECTORIS: ICD-10-CM

## 2024-06-27 RX ORDER — LISINOPRIL 2.5 MG/1
2.5 TABLET ORAL DAILY
Qty: 90 TABLET | Refills: 3 | Status: SHIPPED | OUTPATIENT
Start: 2024-06-27 | End: 2025-06-22

## 2024-06-27 RX ORDER — ATORVASTATIN CALCIUM 80 MG/1
80 TABLET, FILM COATED ORAL DAILY
Qty: 90 TABLET | Refills: 3 | Status: SHIPPED | OUTPATIENT
Start: 2024-06-27

## 2024-06-27 RX ORDER — ISOSORBIDE MONONITRATE 30 MG/1
30 TABLET, EXTENDED RELEASE ORAL DAILY
Qty: 90 TABLET | Refills: 3 | Status: SHIPPED | OUTPATIENT
Start: 2024-06-27

## 2024-06-27 RX ORDER — CARVEDILOL 6.25 MG/1
6.25 TABLET ORAL 2 TIMES DAILY WITH MEALS
Qty: 180 TABLET | Refills: 3 | Status: SHIPPED | OUTPATIENT
Start: 2024-06-27

## 2024-06-27 RX ORDER — CLOPIDOGREL BISULFATE 75 MG/1
75 TABLET ORAL DAILY
Qty: 90 TABLET | Refills: 3 | Status: SHIPPED | OUTPATIENT
Start: 2024-06-27 | End: 2025-07-28

## 2024-06-27 NOTE — TELEPHONE ENCOUNTER
Pt calls indicating she needs all her heart meds refilled.   I qued up the ones I see may be due    Last visit 6-13-24  Next visit 9-24-24

## 2024-08-27 ENCOUNTER — OFFICE VISIT (OUTPATIENT)
Dept: FAMILY MEDICINE CLINIC | Facility: CLINIC | Age: 69
End: 2024-08-27

## 2024-08-27 VITALS
BODY MASS INDEX: 13.77 KG/M2 | WEIGHT: 93 LBS | HEIGHT: 69 IN | DIASTOLIC BLOOD PRESSURE: 80 MMHG | SYSTOLIC BLOOD PRESSURE: 114 MMHG

## 2024-08-27 DIAGNOSIS — Z87.891 PERSONAL HISTORY OF TOBACCO USE: ICD-10-CM

## 2024-08-27 DIAGNOSIS — N17.9 AKI (ACUTE KIDNEY INJURY) (HCC): ICD-10-CM

## 2024-08-27 DIAGNOSIS — E11.65 TYPE 2 DIABETES MELLITUS WITH HYPERGLYCEMIA, WITHOUT LONG-TERM CURRENT USE OF INSULIN (HCC): ICD-10-CM

## 2024-08-27 DIAGNOSIS — I50.22 CHRONIC SYSTOLIC CONGESTIVE HEART FAILURE (HCC): ICD-10-CM

## 2024-08-27 DIAGNOSIS — R63.6 UNDERWEIGHT: ICD-10-CM

## 2024-08-27 DIAGNOSIS — J01.90 ACUTE SINUSITIS, RECURRENCE NOT SPECIFIED, UNSPECIFIED LOCATION: Primary | ICD-10-CM

## 2024-08-27 DIAGNOSIS — E78.5 DYSLIPIDEMIA: ICD-10-CM

## 2024-08-27 DIAGNOSIS — Z12.31 ENCOUNTER FOR SCREENING MAMMOGRAM FOR MALIGNANT NEOPLASM OF BREAST: ICD-10-CM

## 2024-08-27 DIAGNOSIS — F17.200 SMOKER: ICD-10-CM

## 2024-08-27 DIAGNOSIS — Z00.00 ENCOUNTER FOR ANNUAL PHYSICAL EXAM: ICD-10-CM

## 2024-08-27 DIAGNOSIS — Z95.5 HISTORY OF HEART ARTERY STENT: ICD-10-CM

## 2024-08-27 DIAGNOSIS — I25.10 CORONARY ARTERY DISEASE INVOLVING NATIVE CORONARY ARTERY OF NATIVE HEART WITHOUT ANGINA PECTORIS: ICD-10-CM

## 2024-08-27 LAB
ANION GAP SERPL CALC-SCNC: 8 MMOL/L (ref 9–18)
BUN SERPL-MCNC: 11 MG/DL (ref 8–23)
CALCIUM SERPL-MCNC: 9.5 MG/DL (ref 8.8–10.2)
CHLORIDE SERPL-SCNC: 103 MMOL/L (ref 98–107)
CO2 SERPL-SCNC: 29 MMOL/L (ref 20–28)
CREAT SERPL-MCNC: 0.76 MG/DL (ref 0.6–1.1)
EST. AVERAGE GLUCOSE BLD GHB EST-MCNC: 147 MG/DL
GLUCOSE SERPL-MCNC: 140 MG/DL (ref 70–99)
HBA1C MFR BLD: 6.8 % (ref 0–5.6)
INFLUENZA A ANTIGEN, POC: NEGATIVE
INFLUENZA B ANTIGEN, POC: NEGATIVE
LOT EXPIRE DATE: NORMAL
LOT KIT NUMBER: NORMAL
POTASSIUM SERPL-SCNC: 4.7 MMOL/L (ref 3.5–5.1)
SARS-COV-2 RNA, POC: NEGATIVE
SODIUM SERPL-SCNC: 140 MMOL/L (ref 136–145)
VALID INTERNAL CONTROL: YES
VENDOR AND KIT NAME POC: NORMAL

## 2024-08-27 RX ORDER — ATORVASTATIN CALCIUM 80 MG/1
80 TABLET, FILM COATED ORAL DAILY
Qty: 90 TABLET | Refills: 3 | Status: SHIPPED | OUTPATIENT
Start: 2024-08-27

## 2024-08-27 RX ORDER — DOXYCYCLINE HYCLATE 100 MG
100 TABLET ORAL 2 TIMES DAILY
Qty: 14 TABLET | Refills: 0 | Status: SHIPPED | OUTPATIENT
Start: 2024-08-27 | End: 2024-09-03

## 2024-08-27 RX ORDER — VARENICLINE TARTRATE 0.5 MG/1
.5-1 TABLET, FILM COATED ORAL SEE ADMIN INSTRUCTIONS
Qty: 57 TABLET | Refills: 0 | Status: SHIPPED | OUTPATIENT
Start: 2024-08-27

## 2024-08-27 RX ORDER — ALBUTEROL SULFATE 90 UG/1
2 AEROSOL, METERED RESPIRATORY (INHALATION) EVERY 4 HOURS PRN
Qty: 18 G | Refills: 3 | Status: SHIPPED | OUTPATIENT
Start: 2024-08-27

## 2024-08-27 RX ORDER — VARENICLINE TARTRATE 1 MG/1
1 TABLET, FILM COATED ORAL 2 TIMES DAILY
Qty: 60 TABLET | Refills: 3 | Status: SHIPPED | OUTPATIENT
Start: 2024-08-27

## 2024-08-27 RX ORDER — NITROGLYCERIN 0.4 MG/1
0.4 TABLET SUBLINGUAL EVERY 5 MIN PRN
Qty: 25 TABLET | Refills: 0 | Status: SHIPPED | OUTPATIENT
Start: 2024-08-27

## 2024-08-27 RX ORDER — AZELASTINE 1 MG/ML
1 SPRAY, METERED NASAL 2 TIMES DAILY
Qty: 60 ML | Refills: 1 | Status: SHIPPED | OUTPATIENT
Start: 2024-08-27

## 2024-08-27 ASSESSMENT — ENCOUNTER SYMPTOMS
DIARRHEA: 0
EYE ITCHING: 0
FACIAL SWELLING: 0
NAUSEA: 0
VOMITING: 0
SORE THROAT: 0
WHEEZING: 0
EYE REDNESS: 0
COLOR CHANGE: 0
CONSTIPATION: 0
SINUS PAIN: 0
RHINORRHEA: 0
EYE PAIN: 0
SINUS PRESSURE: 0
ABDOMINAL DISTENTION: 0
BLOOD IN STOOL: 0
CHEST TIGHTNESS: 0
EYE DISCHARGE: 0
STRIDOR: 0
SHORTNESS OF BREATH: 1
BACK PAIN: 0
ABDOMINAL PAIN: 0
COUGH: 1
HEMOPTYSIS: 0

## 2024-08-27 NOTE — PROGRESS NOTES
[facility]  _______________________________________  Kwasi Madrid MD                 [address] [place] [address]        Ashok Lucas [place] [address]                                                                                    Phone: [phone]                                                                                    Fax: [phone]    Aide Aviles is a 69 y.o. female who is seen for evaluation of   Chief Complaint   Patient presents with    Congestive Heart Failure    Sinusitis    Cough     X 5 days       HPI:   Aide is a 66 y/o F with h/o CAD, s/p MI with PCI, chronic hep C, t2 diabetes, dyslipidemia, polysubstance abuse, here for f/u.    - wt loss- down 3 lbs. Starting meals on wheels. Last c-scope was 8/2023- 3 TA recommended 3 yr recall. She has not had the mammogram or LDCT lungs that was ordered. Last CXR 2/2023 negative.     - CAD- ST elevation MI 12/16/2021. mid LAD with successful PCI and 2 overlapping drug-eluting stents. Had NSTEMI in 2/2023. She underwent LHC on 3/1 with severe small branch vessel disease. Cards felt this was best treated medically. She was positive for cocaine and cannabinoid on urine drug screen. Apparently she had not been compliant with DAPT, statin prior to event.     - dyslipidemia- Last LDL 39 on lipitor. Mostly compliant with diet.     - hep C- Pt states that she was diagnosed at [facility] a number of years ago, but never saw GI. LFTs have been stable.     - type 2 DM- Her A1c 6.6<--6.7. She has been out of metformin x 1 month.     - still smoking. Never started chantix.     Cough  This is a new problem. The current episode started in the past 7 days. The problem has been gradually worsening. The problem occurs constantly. The cough is Productive of purulent sputum. Associated symptoms include nasal congestion, postnasal drip and shortness of breath. Pertinent negatives include no chest pain, chills,  with patient the importance of compliance with yearly annual lung cancer screenings and willingness to undergo diagnosis and treatment if screening scan is positive.  In addition, the patient was counseled regarding the importance of remaining smoke free and/or total smoking cessation.    Also reviewed the following if the patient has Medicare that as of February 10, 2022, Medicare only covers LDCT screening in patients aged 50-77 with at least a 20 pack-year smoking history who currently smoke or have quit in the last 15 years

## 2024-08-28 PROBLEM — R63.6 UNDERWEIGHT: Status: ACTIVE | Noted: 2024-08-28

## 2024-08-28 PROBLEM — J01.90 ACUTE SINUSITIS: Status: ACTIVE | Noted: 2024-08-28

## 2024-08-28 NOTE — ASSESSMENT & PLAN NOTE
LDL goal < 70. Continue atorvastatin 80mg daily. Encouraged to reduce red meat, fried foods, fast foods, butter, mayonnaise and dairy products; increase daily exercise and limit intake of egg yolks to < 7 egg yolks weekly.

## 2024-08-28 NOTE — ASSESSMENT & PLAN NOTE
Encouraged compliance with imdur 30mg daily, coreg 6.25mg bid, plavix 75mg daily. Stressed importance of low sodium and low fat diet (limit processed foods, fast foods and avoid adding salt to cooked food), reduce red meat, butter, mayonnaise, egg yolks [<1 egg yolk daily] and dairy products, increase daily exercise [at least 30 sustained minutes 3-5 times weekly])

## 2024-08-28 NOTE — PATIENT INSTRUCTIONS
Learning About Lung Cancer Screening  What is screening for lung cancer?     Lung cancer screening is a way to find some lung cancers early, before a person has any symptoms of the cancer.  Lung cancer screening may help those who have the highest risk for lung cancer--people age 50 and older who are or were heavy smokers. For most people, who aren't at increased risk, screening for lung cancer probably isn't helpful.  Screening won't prevent cancer. And it may not find all lung cancers. Lung cancer screening may lower the risk of dying from lung cancer in a small number of people.  How is it done?  Lung cancer screening is done with a low-dose CT (computed tomography) scan. A CT scan uses X-rays, or radiation, to make detailed pictures of your body. Experts recommend that screening be done in medical centers that focus on finding and treating lung cancer.  Who is screening recommended for?  Lung cancer screening is recommended for people age 50 and older who are or were heavy smokers. That means people with a smoking history of at least 20 pack years. A pack year is a way to measure how heavy a smoker you are or were.  To figure out your pack years, multiply how many packs a day on average (assuming 20 cigarettes per pack) you have smoked by how many years you have smoked. For example:  If you smoked 1 pack a day for 20 years, that's 1 times 20. So you have a smoking history of 20 pack years.  If you smoked 2 packs a day for 10 years, that's 2 times 10. So you have a smoking history of 20 pack years.  Experts agree that screening is for people who have a high risk of lung cancer. But experts don't agree on what high risk means. Some say people age 50 or older with at least a 20-pack-year smoking history are high risk. Others say it's people age 55 or older with a 30-pack-year history.  To see if you could benefit from screening, first find out if you are at high risk for lung cancer. Your doctor can help you  follow-up, he or she will help you understand what to do next.  After a lung cancer screening, you can go back to your usual activities right away.  A lung cancer screening test can't tell if you have lung cancer. If your results are positive, your doctor can't tell whether an abnormal finding is a harmless nodule, cancer, or something else without doing more tests.  What can you do to help prevent lung cancer?  Some lung cancers can't be prevented. But if you smoke, quitting smoking is the best step you can take to prevent lung cancer. If you want to quit, your doctor can recommend medicines or other ways to help.  Follow-up care is a key part of your treatment and safety. Be sure to make and go to all appointments, and call your doctor if you are having problems. It's also a good idea to know your test results and keep a list of the medicines you take.  Where can you learn more?  Go to https://www.Adbrain.net/patientEd and enter Q940 to learn more about \"Learning About Lung Cancer Screening.\"  Current as of: October 25, 2023  Content Version: 14.1  © 8673-4116 [employer].   Care instructions adapted under license by [employer]. If you have questions about a medical condition or this instruction, always ask your healthcare professional. [employer] disclaims any warranty or liability for your use of this information.

## 2024-08-28 NOTE — ASSESSMENT & PLAN NOTE
Great A8u-lkuormr today. Continue metformin 500mg bid. Encouraged to control intake of sugar, bread, pasta, rice, potatoes, fruit, snack foods, desserts in diet. Discussed importance of daily foot check to ensure no lesion and if any wounds noted, RTC for evaluation ASAP. Instructed to refrain from walking with open toed shoes, especially outdoors and wear foot protection even in house. Encouraged to refrain from trimming toe nails short and if unable to easily trim without injury, we will schedule podiatry referral. Recommended daily BG checks for goal of <120 fasting and <180 PP. Instructed to check BG if s/s of hypoglycemia occur-shaking, mental fog, cold sweats-and if < 70, eat cracker with peanut butter and glass of milk and recheck BG in 1 hr.

## 2024-08-28 NOTE — ASSESSMENT & PLAN NOTE
Poor caloric intake vs COPD vs neoplasm. Will need to comply with LDCT and mammogram. C-scope ok in 2023. Increase protein calories. Encouraged immediate smoking cessation.

## 2024-09-16 RX ORDER — CALCIUM CITRATE/VITAMIN D3 200MG-6.25
TABLET ORAL
Qty: 200 STRIP | Refills: 3 | Status: SHIPPED | OUTPATIENT
Start: 2024-09-16

## 2024-12-02 RX ORDER — GLUCOSAM/CHON-MSM1/C/MANG/BOSW 500-416.6
TABLET ORAL
Qty: 200 EACH | Refills: 3 | Status: SHIPPED | OUTPATIENT
Start: 2024-12-02

## 2024-12-02 RX ORDER — ISOPROPYL ALCOHOL 70 ML/100ML
SWAB TOPICAL
Qty: 200 EACH | Refills: 3 | Status: SHIPPED | OUTPATIENT
Start: 2024-12-02

## 2024-12-02 NOTE — TELEPHONE ENCOUNTER
requesting refill(s) on     Requested Prescriptions     Pending Prescriptions Disp Refills    TRUEplus Lancets 33G MISC [Pharmacy Med Name: TRUEplus Lancets 33G Miscellaneous] 200 each 3     Sig: TEST BLOOD SUGAR ONE TIME A DAY AND AS NEEDED FOR SYMPTOMS OF IRREGULAR BLOOD GLUCOSE    Alcohol Swabs (DROPSAFE ALCOHOL PREP) 70 % PADS [Pharmacy Med Name: DropSafe Alcohol Prep Pad 70 %] 200 each 3     Sig: USE EVERY DAY AND AS NEEDED AS DIRECTED       Last appointment- 8/27/24  Next appointment- 12/3/24

## 2024-12-03 ENCOUNTER — OFFICE VISIT (OUTPATIENT)
Dept: FAMILY MEDICINE CLINIC | Facility: CLINIC | Age: 69
End: 2024-12-03
Payer: MEDICARE

## 2024-12-03 VITALS
WEIGHT: 91 LBS | SYSTOLIC BLOOD PRESSURE: 100 MMHG | BODY MASS INDEX: 13.48 KG/M2 | DIASTOLIC BLOOD PRESSURE: 60 MMHG | HEIGHT: 69 IN

## 2024-12-03 DIAGNOSIS — G25.81 RESTLESS LEGS SYNDROME: ICD-10-CM

## 2024-12-03 DIAGNOSIS — I50.22 CHRONIC SYSTOLIC CONGESTIVE HEART FAILURE (HCC): ICD-10-CM

## 2024-12-03 DIAGNOSIS — N39.41 URGE INCONTINENCE OF URINE: ICD-10-CM

## 2024-12-03 DIAGNOSIS — E11.65 TYPE 2 DIABETES MELLITUS WITH HYPERGLYCEMIA, WITHOUT LONG-TERM CURRENT USE OF INSULIN (HCC): Primary | ICD-10-CM

## 2024-12-03 DIAGNOSIS — N28.9 ABNORMAL RENAL FUNCTION: ICD-10-CM

## 2024-12-03 LAB
ANION GAP SERPL CALC-SCNC: 11 MMOL/L (ref 7–16)
BUN SERPL-MCNC: 32 MG/DL (ref 8–23)
CALCIUM SERPL-MCNC: 9.4 MG/DL (ref 8.8–10.2)
CHLORIDE SERPL-SCNC: 100 MMOL/L (ref 98–107)
CO2 SERPL-SCNC: 29 MMOL/L (ref 20–29)
CREAT SERPL-MCNC: 0.99 MG/DL (ref 0.6–1.1)
EST. AVERAGE GLUCOSE BLD GHB EST-MCNC: 153 MG/DL
GLUCOSE SERPL-MCNC: 209 MG/DL (ref 70–99)
HBA1C MFR BLD: 7 % (ref 0–5.6)
POTASSIUM SERPL-SCNC: 4.1 MMOL/L (ref 3.5–5.1)
SODIUM SERPL-SCNC: 140 MMOL/L (ref 136–145)

## 2024-12-03 PROCEDURE — 4004F PT TOBACCO SCREEN RCVD TLK: CPT | Performed by: FAMILY MEDICINE

## 2024-12-03 PROCEDURE — 1123F ACP DISCUSS/DSCN MKR DOCD: CPT | Performed by: FAMILY MEDICINE

## 2024-12-03 PROCEDURE — G8427 DOCREV CUR MEDS BY ELIG CLIN: HCPCS | Performed by: FAMILY MEDICINE

## 2024-12-03 PROCEDURE — G8419 CALC BMI OUT NRM PARAM NOF/U: HCPCS | Performed by: FAMILY MEDICINE

## 2024-12-03 PROCEDURE — 1159F MED LIST DOCD IN RCRD: CPT | Performed by: FAMILY MEDICINE

## 2024-12-03 PROCEDURE — 3051F HG A1C>EQUAL 7.0%<8.0%: CPT | Performed by: FAMILY MEDICINE

## 2024-12-03 PROCEDURE — G8400 PT W/DXA NO RESULTS DOC: HCPCS | Performed by: FAMILY MEDICINE

## 2024-12-03 PROCEDURE — 1160F RVW MEDS BY RX/DR IN RCRD: CPT | Performed by: FAMILY MEDICINE

## 2024-12-03 PROCEDURE — 99214 OFFICE O/P EST MOD 30 MIN: CPT | Performed by: FAMILY MEDICINE

## 2024-12-03 PROCEDURE — 3017F COLORECTAL CA SCREEN DOC REV: CPT | Performed by: FAMILY MEDICINE

## 2024-12-03 PROCEDURE — 0509F URINE INCON PLAN DOCD: CPT | Performed by: FAMILY MEDICINE

## 2024-12-03 PROCEDURE — G8484 FLU IMMUNIZE NO ADMIN: HCPCS | Performed by: FAMILY MEDICINE

## 2024-12-03 PROCEDURE — 1090F PRES/ABSN URINE INCON ASSESS: CPT | Performed by: FAMILY MEDICINE

## 2024-12-03 PROCEDURE — 2022F DILAT RTA XM EVC RTNOPTHY: CPT | Performed by: FAMILY MEDICINE

## 2024-12-03 RX ORDER — OXYBUTYNIN CHLORIDE 5 MG/1
5 TABLET, EXTENDED RELEASE ORAL DAILY
Qty: 30 TABLET | Refills: 3 | Status: SHIPPED | OUTPATIENT
Start: 2024-12-03

## 2024-12-03 RX ORDER — ROPINIROLE 0.25 MG/1
0.25 TABLET, FILM COATED ORAL NIGHTLY
Qty: 90 TABLET | Refills: 3 | Status: SHIPPED | OUTPATIENT
Start: 2024-12-03

## 2024-12-03 SDOH — ECONOMIC STABILITY: FOOD INSECURITY: WITHIN THE PAST 12 MONTHS, YOU WORRIED THAT YOUR FOOD WOULD RUN OUT BEFORE YOU GOT MONEY TO BUY MORE.: SOMETIMES TRUE

## 2024-12-03 SDOH — ECONOMIC STABILITY: INCOME INSECURITY: HOW HARD IS IT FOR YOU TO PAY FOR THE VERY BASICS LIKE FOOD, HOUSING, MEDICAL CARE, AND HEATING?: HARD

## 2024-12-03 SDOH — ECONOMIC STABILITY: FOOD INSECURITY: WITHIN THE PAST 12 MONTHS, THE FOOD YOU BOUGHT JUST DIDN'T LAST AND YOU DIDN'T HAVE MONEY TO GET MORE.: NEVER TRUE

## 2024-12-03 ASSESSMENT — ENCOUNTER SYMPTOMS: COUGH: 1

## 2024-12-03 NOTE — PATIENT INSTRUCTIONS
[place] [employer] [facility]*  (Call [employer]/211 if you need more resources.)    [place] [employer]  They Offer: Housing Choice Vouchers (Section 8) rental assistance available to persons with disabilities, families with children, and older adults whose household income is below 80% the median income for [facility].   Contact: [phone]; Website:www.Viss.[employer] [place]    [employer] [place] [employer] Homeless - National Call Center   They offer: Free 24/7 access to trained counselors for local resources and assistance.  Contact: 581.760.5164 Website: www.[place].gov[place]homeless/nationalcallcenter.asp      [employer]    https://www.[employer] Vine.bop.fm/qjtjhqurww-kxqnyh-wt/    [place] Housing Search    https://www.Fision.bop.fm/    [facility] Connections:  They Offer: Homelessness Prevention, Affordable Housing, Outreach Support  Contact: [phone]; [address] [place] [address], [place] [address]  Helpful Info: Hours are Monday -Friday 8:30am-4:30pm.    [place] [employer]   They Offer: Provides shelter to homeless men and families, emergency housing, meals, clothing, personal care items, gospel services, discipleship, personal counseling, drug addiction counseling, GED training. To receive services, families must have marriage license to receive housing.  Contact: [address] [place] [address]; [phone]  Helpful Info:  Hours of Operation: 8:00 a.m. - 9:00 p.m. Monday -Friday      [employer]   They Offer: Helps with Transitional housing (first week is free, then $7 per day if single; families with children pay $25.00 per week), food, emergency shelter for men and women, helps with prescriptions, but only up to $125 per person per year. To receive services, you need the cut off notice or eviction notice to receive help. Must have proof of address, proof of income, and Social Security card for every individual in the household, photo

## 2024-12-03 NOTE — PROGRESS NOTES
tablet    Restless legs syndrome      New problem.          Relevant Medications    rOPINIRole (REQUIP) 0.25 MG tablet        Ashok Lucas III, MD

## 2024-12-04 PROBLEM — N28.9 ABNORMAL RENAL FUNCTION: Status: ACTIVE | Noted: 2024-12-04

## 2024-12-04 PROBLEM — G25.81 RESTLESS LEGS SYNDROME: Status: ACTIVE | Noted: 2024-12-04

## 2024-12-04 PROBLEM — N39.41 URGE INCONTINENCE OF URINE: Status: ACTIVE | Noted: 2024-12-04

## 2024-12-04 ASSESSMENT — ENCOUNTER SYMPTOMS
WHEEZING: 0
FACIAL SWELLING: 0
CONSTIPATION: 0
RHINORRHEA: 0
COLOR CHANGE: 0
ABDOMINAL DISTENTION: 0
EYE REDNESS: 0
NAUSEA: 0
SORE THROAT: 0
SHORTNESS OF BREATH: 0
EYE ITCHING: 0
BACK PAIN: 0
DIARRHEA: 0
CHEST TIGHTNESS: 0
ABDOMINAL PAIN: 0
SINUS PAIN: 0
EYE DISCHARGE: 0
STRIDOR: 0
BLOOD IN STOOL: 0
EYE PAIN: 0
VOMITING: 0
SINUS PRESSURE: 0

## 2024-12-04 NOTE — ASSESSMENT & PLAN NOTE
Concerned her A1c may be rising. Discussed dietary modifications at length. Encouraged to control intake of sugar, bread, pasta, rice, potatoes, fruit, snack foods, desserts in diet. Discussed importance of daily foot check to ensure no lesion and if any wounds noted, RTC for evaluation ASAP. Instructed to refrain from walking with open toed shoes, especially outdoors and wear foot protection even in house. Encouraged to refrain from trimming toe nails short and if unable to easily trim without injury, we will schedule podiatry referral. Recommended daily BG checks for goal of <120 fasting and <180 PP. Instructed to check BG if s/s of hypoglycemia occur-shaking, mental fog, cold sweats-and if < 70, eat cracker with peanut butter and glass of milk and recheck BG in 1 hr.

## 2025-01-21 ENCOUNTER — OFFICE VISIT (OUTPATIENT)
Dept: FAMILY MEDICINE CLINIC | Facility: CLINIC | Age: 70
End: 2025-01-21

## 2025-01-21 VITALS
DIASTOLIC BLOOD PRESSURE: 80 MMHG | BODY MASS INDEX: 14.81 KG/M2 | SYSTOLIC BLOOD PRESSURE: 128 MMHG | WEIGHT: 100 LBS | TEMPERATURE: 97.7 F | HEIGHT: 69 IN

## 2025-01-21 DIAGNOSIS — E78.5 DYSLIPIDEMIA: ICD-10-CM

## 2025-01-21 DIAGNOSIS — I25.10 CORONARY ARTERY DISEASE INVOLVING NATIVE CORONARY ARTERY OF NATIVE HEART WITHOUT ANGINA PECTORIS: ICD-10-CM

## 2025-01-21 DIAGNOSIS — E11.65 TYPE 2 DIABETES MELLITUS WITH HYPERGLYCEMIA, WITHOUT LONG-TERM CURRENT USE OF INSULIN (HCC): ICD-10-CM

## 2025-01-21 DIAGNOSIS — Z00.00 MEDICARE ANNUAL WELLNESS VISIT, SUBSEQUENT: Primary | ICD-10-CM

## 2025-01-21 DIAGNOSIS — R09.89 WEAK ARTERIAL PULSE: ICD-10-CM

## 2025-01-21 DIAGNOSIS — Z91.81 AT HIGH RISK FOR FALLS: ICD-10-CM

## 2025-01-21 DIAGNOSIS — F17.200 SMOKER: ICD-10-CM

## 2025-01-21 DIAGNOSIS — J06.9 ACUTE URI: ICD-10-CM

## 2025-01-21 DIAGNOSIS — I50.22 CHRONIC SYSTOLIC CONGESTIVE HEART FAILURE (HCC): ICD-10-CM

## 2025-01-21 LAB
BASOPHILS # BLD: 0.02 K/UL (ref 0–0.2)
BASOPHILS NFR BLD: 0.4 % (ref 0–2)
CREAT UR-MCNC: 83 MG/DL (ref 28–217)
DIFFERENTIAL METHOD BLD: ABNORMAL
EOSINOPHIL # BLD: 0.04 K/UL (ref 0–0.8)
EOSINOPHIL NFR BLD: 0.8 % (ref 0.5–7.8)
ERYTHROCYTE [DISTWIDTH] IN BLOOD BY AUTOMATED COUNT: 13.8 % (ref 11.9–14.6)
HCT VFR BLD AUTO: 39.2 % (ref 35.8–46.3)
HGB BLD-MCNC: 12.4 G/DL (ref 11.7–15.4)
IMM GRANULOCYTES # BLD AUTO: 0.01 K/UL (ref 0–0.5)
IMM GRANULOCYTES NFR BLD AUTO: 0.2 % (ref 0–5)
INFLUENZA A ANTIGEN, POC: NEGATIVE
INFLUENZA B ANTIGEN, POC: NEGATIVE
LOT EXPIRE DATE: NORMAL
LOT KIT NUMBER: NORMAL
LYMPHOCYTES # BLD: 2.39 K/UL (ref 0.5–4.6)
LYMPHOCYTES NFR BLD: 49.8 % (ref 13–44)
MCH RBC QN AUTO: 27.6 PG (ref 26.1–32.9)
MCHC RBC AUTO-ENTMCNC: 31.6 G/DL (ref 31.4–35)
MCV RBC AUTO: 87.3 FL (ref 82–102)
MICROALBUMIN UR-MCNC: <1.2 MG/DL (ref 0–20)
MICROALBUMIN/CREAT UR-RTO: NORMAL MG/G (ref 0–30)
MONOCYTES # BLD: 0.49 K/UL (ref 0.1–1.3)
MONOCYTES NFR BLD: 10.2 % (ref 4–12)
NEUTS SEG # BLD: 1.85 K/UL (ref 1.7–8.2)
NEUTS SEG NFR BLD: 38.6 % (ref 43–78)
NRBC # BLD: 0 K/UL (ref 0–0.2)
PLATELET # BLD AUTO: 239 K/UL (ref 150–450)
PMV BLD AUTO: 10.6 FL (ref 9.4–12.3)
RBC # BLD AUTO: 4.49 M/UL (ref 4.05–5.2)
SARS-COV-2 RNA, POC: NEGATIVE
VALID INTERNAL CONTROL: YES
VENDOR AND KIT NAME POC: NORMAL
WBC # BLD AUTO: 4.8 K/UL (ref 4.3–11.1)

## 2025-01-21 RX ORDER — CETIRIZINE HYDROCHLORIDE 10 MG/1
10 TABLET ORAL DAILY
Qty: 30 TABLET | Refills: 0 | Status: SHIPPED | OUTPATIENT
Start: 2025-01-21

## 2025-01-21 RX ORDER — FLUTICASONE PROPIONATE 50 MCG
1 SPRAY, SUSPENSION (ML) NASAL DAILY
Qty: 32 G | Refills: 1 | Status: SHIPPED | OUTPATIENT
Start: 2025-01-21

## 2025-01-21 SDOH — ECONOMIC STABILITY: FOOD INSECURITY: WITHIN THE PAST 12 MONTHS, YOU WORRIED THAT YOUR FOOD WOULD RUN OUT BEFORE YOU GOT MONEY TO BUY MORE.: SOMETIMES TRUE

## 2025-01-21 SDOH — ECONOMIC STABILITY: FOOD INSECURITY: WITHIN THE PAST 12 MONTHS, THE FOOD YOU BOUGHT JUST DIDN'T LAST AND YOU DIDN'T HAVE MONEY TO GET MORE.: SOMETIMES TRUE

## 2025-01-21 ASSESSMENT — ENCOUNTER SYMPTOMS
ABDOMINAL DISTENTION: 0
SHORTNESS OF BREATH: 0
CONSTIPATION: 0
BACK PAIN: 0
SWOLLEN GLANDS: 0
VOMITING: 0
RHINORRHEA: 0
COUGH: 1
SINUS PAIN: 0
EYE REDNESS: 0
HOARSE VOICE: 1
SORE THROAT: 0
ABDOMINAL PAIN: 0
CHEST TIGHTNESS: 0
WHEEZING: 0
SINUS PRESSURE: 0
EYE PAIN: 0
NAUSEA: 0
EYE DISCHARGE: 0
EYE ITCHING: 0
COLOR CHANGE: 0
BLOOD IN STOOL: 0
DIARRHEA: 0
STRIDOR: 0
FACIAL SWELLING: 0

## 2025-01-21 ASSESSMENT — PATIENT HEALTH QUESTIONNAIRE - PHQ9
2. FEELING DOWN, DEPRESSED OR HOPELESS: NOT AT ALL
SUM OF ALL RESPONSES TO PHQ QUESTIONS 1-9: 0
SUM OF ALL RESPONSES TO PHQ9 QUESTIONS 1 & 2: 0
1. LITTLE INTEREST OR PLEASURE IN DOING THINGS: NOT AT ALL

## 2025-01-21 ASSESSMENT — LIFESTYLE VARIABLES
HOW OFTEN DO YOU HAVE A DRINK CONTAINING ALCOHOL: 2-4 TIMES A MONTH
HOW MANY STANDARD DRINKS CONTAINING ALCOHOL DO YOU HAVE ON A TYPICAL DAY: 1 OR 2

## 2025-01-21 NOTE — PATIENT INSTRUCTIONS
[place] [address]    [facility] Food Pantry  They offer: Groceries/food.  Contact: [phone]; [address] [place] [address]  Helpful Info: Open Thursday 8am-12pm.    [facility]  They offer: Groceries/food.  Contact: [phone]; [address] [place] [address]  Helpful Info: Open 8am-5pm Monday-Thursdays, 8am-12pm Fridays.    [facility]  They offer: Groceries/food.  Contact: [phone]; [address] [place] [address]  Helpful Info: Must call for hours and availability.     [employer]   They offer: Clothes/ food.   Contact: [phone]; [address]  [place] [address]  Helpful Info: Food bags/ hygiene kits Each Wednesday 7am- 11pm.  Hot Meals every Saturday at 12 noon and every Sunday after Jehovah's witness.  Hot Breakfast every Monday 7am- 8am.    [facility] Food Pant[facility]  They offer: Groceries/food.  Contact: [phone]  Helpful Info: Call for hours and availability.     [facility]  They offer: Emergency food.  Contact: [phone]; [address] [place] [address]  Helpful Info: Hours are Monday, Wednesday, and Friday 9am-1pm.     [employer] Food Pantry  They offer: Groceries/food.  Contact: [phone]; [address] [place] [address]  Helpful Info: Call for hours and availability.     [facility] Food Pantry  They offer: Groceries/food.  Contact: [phone]; [address] [place] [address]  Helpful Info: Call for hours and availability.     [facility]   They offer: Food   Contact:  [phone] [address] [place] [address]  Helpful Info: Hours are Tuesday and Thursday 10am- 12:30pm       Preventing Falls: Care Instructions  Injuries and health problems such as trouble walking or poor eyesight can increase your risk of falling. So can some medicines. But there are things you can do to help prevent

## 2025-01-21 NOTE — ASSESSMENT & PLAN NOTE
Ldl at goal. Continue lipitor. Encouraged to reduce red meat, fried foods, fast foods, butter, mayonnaise and dairy products; increase daily aerobic exercise.

## 2025-01-21 NOTE — ASSESSMENT & PLAN NOTE
Refuses chantix and wellbutrin. She cites no motivation to quite. Counseling provided. Will re-approach on next visit.

## 2025-01-21 NOTE — PROGRESS NOTES
Diagnosis Date    Angina pectoris, unspecified 03/04/2024    CAD (coronary artery disease)     mi 12/2021 (stents placed) and 2/2023 (severe small branch vessel disease being treated medically)--- followed by dr petersen at [facility]    CHF (congestive heart failure) ([facility])     EF 45-50% PER DR SCHNEIDER NOTE in care everywhere    Diabetes mellitus ([facility])     oral med-does not check sqbs at home    Hepatitis C     Hyperlipidemia     Hypertension     controlled with med    Polysubstance abuse ([facility])     per note in care everywhere tested + for cocaine and cannabinoid 2/2023--pt does admit to\" still smoking pot 3-4 times weekly\" on phone assess 10/12/23    Sleep apnea     does not use CPAP    Small vessel coronary artery disease     severe per dr arriaga note after cath 3/1/2023       Past Surgical History:   Procedure Laterality Date    CARDIAC CATHETERIZATION  03/01/2023    CARDIAC CATHETERIZATION  12/2021    COLONOSCOPY N/A 10/17/2023    COLONOSCOPY, POLYPECTOMY performed by Gaby Duran DO at [facility] ENDOSCOPY    FACIAL FRACTURE SURGERY  1980       Family History   Problem Relation Age of Onset    Diabetes Mother     Heart Disease Father     Cancer Father        Social History     Tobacco Use    Smoking status: Every Day     Current packs/day: 0.25     Average packs/day: 0.3 packs/day for 52.0 years (13.0 ttl pk-yrs)     Types: Cigarettes    Smokeless tobacco: Never   Substance Use Topics    Alcohol use: Yes     Comment: occ       No Known Allergies    Current Outpatient Medications   Medication Sig Dispense Refill    fluticasone (FLONASE) 50 MCG/ACT nasal spray 1 spray by Each Nostril route daily 32 g 1    cetirizine (ZYRTEC) 10 MG tablet Take 1 tablet by mouth daily 30 tablet 0    oxyBUTYnin (DITROPAN XL) 5 MG extended release tablet Take 1 tablet by mouth daily 30 tablet 3    rOPINIRole (REQUIP) 0.25 MG tablet Take 1 tablet by mouth nightly For restless legs 90 tablet 3    TRUEplus Lancets 33G MISC

## 2025-01-21 NOTE — ASSESSMENT & PLAN NOTE
A1c at goal. Continue metformin. Encouraged to control intake of sugar, bread, pasta, rice, potatoes, fruit, snack foods, desserts in diet. Discussed importance of daily foot check to ensure no lesion and if any wounds noted, RTC for evaluation ASAP. Instructed to refrain from walking with open toed shoes, especially outdoors and wear foot protection even in house. Encouraged to refrain from trimming toe nails short and if unable to easily trim without injury, we will schedule podiatry referral. Recommended daily BG checks for goal of <120 fasting and <180 PP. Instructed to check BG if s/s of hypoglycemia occur-shaking, mental fog, cold sweats-and if < 70, eat cracker with peanut butter and glass of milk and recheck BG in 1 hr.

## 2025-02-07 ENCOUNTER — APPOINTMENT (OUTPATIENT)
Dept: GENERAL RADIOLOGY | Age: 70
End: 2025-02-07
Payer: MEDICARE

## 2025-02-07 ENCOUNTER — HOSPITAL ENCOUNTER (EMERGENCY)
Age: 70
Discharge: HOME | End: 2025-02-07
Attending: STUDENT IN AN ORGANIZED HEALTH CARE EDUCATION/TRAINING PROGRAM
Payer: MEDICARE

## 2025-02-07 VITALS
WEIGHT: 95 LBS | OXYGEN SATURATION: 94 % | RESPIRATION RATE: 28 BRPM | DIASTOLIC BLOOD PRESSURE: 70 MMHG | SYSTOLIC BLOOD PRESSURE: 96 MMHG | TEMPERATURE: 98.4 F | HEIGHT: 69 IN | HEART RATE: 83 BPM | BODY MASS INDEX: 14.07 KG/M2

## 2025-02-07 DIAGNOSIS — J18.9 COMMUNITY ACQUIRED PNEUMONIA OF RIGHT UPPER LOBE OF LUNG: Primary | ICD-10-CM

## 2025-02-07 LAB
ALBUMIN SERPL-MCNC: 3.5 G/DL (ref 3.2–4.6)
ALBUMIN/GLOB SERPL: 0.8 (ref 1–1.9)
ALP SERPL-CCNC: 152 U/L (ref 35–104)
ALT SERPL-CCNC: 61 U/L (ref 12–65)
AMORPH CRY URNS QL MICRO: ABNORMAL
ANION GAP SERPL CALC-SCNC: 12 MMOL/L (ref 7–16)
APPEARANCE UR: ABNORMAL
AST SERPL-CCNC: 96 U/L (ref 15–37)
BACTERIA URNS QL MICRO: 0 /HPF
BASOPHILS # BLD: 0.06 K/UL (ref 0–0.2)
BASOPHILS NFR BLD: 0.4 % (ref 0–2)
BILIRUB SERPL-MCNC: 2.1 MG/DL (ref 0–1.2)
BILIRUB UR QL: ABNORMAL
BUN SERPL-MCNC: 19 MG/DL (ref 8–23)
CALCIUM SERPL-MCNC: 9 MG/DL (ref 8.8–10.2)
CASTS URNS QL MICRO: ABNORMAL /LPF
CHLORIDE SERPL-SCNC: 93 MMOL/L (ref 98–107)
CO2 SERPL-SCNC: 28 MMOL/L (ref 20–29)
COLOR UR: ABNORMAL
CREAT SERPL-MCNC: 1 MG/DL (ref 0.8–1.3)
DIFFERENTIAL METHOD BLD: ABNORMAL
EKG ATRIAL RATE: 90 BPM
EKG DIAGNOSIS: NORMAL
EKG P AXIS: 80 DEGREES
EKG P-R INTERVAL: 140 MS
EKG Q-T INTERVAL: 365 MS
EKG QRS DURATION: 80 MS
EKG QTC CALCULATION (BAZETT): 447 MS
EKG R AXIS: -49 DEGREES
EKG T AXIS: 79 DEGREES
EKG VENTRICULAR RATE: 90 BPM
EOSINOPHIL # BLD: 0.19 K/UL (ref 0–0.8)
EOSINOPHIL NFR BLD: 1.2 % (ref 0.5–7.8)
EPI CELLS #/AREA URNS HPF: ABNORMAL /HPF
ERYTHROCYTE [DISTWIDTH] IN BLOOD BY AUTOMATED COUNT: 12.9 % (ref 11.9–14.6)
FLUAV RNA SPEC QL NAA+PROBE: NOT DETECTED
FLUBV RNA SPEC QL NAA+PROBE: NOT DETECTED
GLOBULIN SER CALC-MCNC: 4.6 G/DL (ref 2.3–3.5)
GLUCOSE BLD STRIP.AUTO-MCNC: 244 MG/DL (ref 65–100)
GLUCOSE SERPL-MCNC: 273 MG/DL (ref 65–100)
GLUCOSE UR STRIP.AUTO-MCNC: 100 MG/DL
HCT VFR BLD AUTO: 37.2 % (ref 35.8–46.3)
HGB BLD-MCNC: 12.7 G/DL (ref 11.7–15.4)
HGB UR QL STRIP: ABNORMAL
IMM GRANULOCYTES # BLD AUTO: 0.29 K/UL (ref 0–0.5)
IMM GRANULOCYTES NFR BLD AUTO: 1.8 % (ref 0–5)
KETONES UR QL STRIP.AUTO: NEGATIVE MG/DL
LACTATE SERPL-SCNC: 1.6 MMOL/L (ref 0.5–2)
LACTATE SERPL-SCNC: 2.1 MMOL/L (ref 0.5–2)
LEUKOCYTE ESTERASE UR QL STRIP.AUTO: NEGATIVE
LYMPHOCYTES # BLD: 1.13 K/UL (ref 0.5–4.6)
LYMPHOCYTES NFR BLD: 7.2 % (ref 13–44)
MCH RBC QN AUTO: 27.6 PG (ref 26.1–32.9)
MCHC RBC AUTO-ENTMCNC: 34.1 G/DL (ref 31.4–35)
MCV RBC AUTO: 80.9 FL (ref 82–102)
MONOCYTES # BLD: 1.45 K/UL (ref 0.1–1.3)
MONOCYTES NFR BLD: 9.2 % (ref 4–12)
MUCOUS THREADS URNS QL MICRO: 0 /LPF
NEUTS SEG # BLD: 12.59 K/UL (ref 1.7–8.2)
NEUTS SEG NFR BLD: 80.2 % (ref 43–78)
NITRITE UR QL STRIP.AUTO: NEGATIVE
NRBC # BLD: 0 K/UL (ref 0–0.2)
NT PRO BNP: 3113 PG/ML (ref 0–450)
OTHER OBSERVATIONS: ABNORMAL
PH UR STRIP: 5.5 (ref 5–9)
PLATELET # BLD AUTO: 249 K/UL (ref 150–450)
PMV BLD AUTO: 10.4 FL (ref 9.4–12.3)
POTASSIUM SERPL-SCNC: 3.5 MMOL/L (ref 3.5–5.1)
PROCALCITONIN SERPL-MCNC: 0.73 NG/ML (ref 0–0.49)
PROT SERPL-MCNC: 8.1 G/DL (ref 6.3–8.2)
PROT UR STRIP-MCNC: 100 MG/DL
RBC # BLD AUTO: 4.6 M/UL (ref 4.05–5.2)
RBC #/AREA URNS HPF: ABNORMAL /HPF
SARS-COV-2 RDRP RESP QL NAA+PROBE: NOT DETECTED
SERVICE CMNT-IMP: ABNORMAL
SODIUM SERPL-SCNC: 133 MMOL/L (ref 133–143)
SOURCE: NORMAL
SP GR UR REFRACTOMETRY: 1.02 (ref 1–1.02)
TROPONIN T SERPL HS-MCNC: 8.3 NG/L (ref 0–14)
UROBILINOGEN UR QL STRIP.AUTO: 4 EU/DL (ref 0.2–1)
WBC # BLD AUTO: 15.7 K/UL (ref 4.3–11.1)
WBC URNS QL MICRO: 0 /HPF

## 2025-02-07 PROCEDURE — 6360000002 HC RX W HCPCS: Performed by: STUDENT IN AN ORGANIZED HEALTH CARE EDUCATION/TRAINING PROGRAM

## 2025-02-07 PROCEDURE — 93005 ELECTROCARDIOGRAM TRACING: CPT | Performed by: STUDENT IN AN ORGANIZED HEALTH CARE EDUCATION/TRAINING PROGRAM

## 2025-02-07 PROCEDURE — 87502 INFLUENZA DNA AMP PROBE: CPT

## 2025-02-07 PROCEDURE — 83605 ASSAY OF LACTIC ACID: CPT

## 2025-02-07 PROCEDURE — 84145 PROCALCITONIN (PCT): CPT

## 2025-02-07 PROCEDURE — 2580000003 HC RX 258: Performed by: STUDENT IN AN ORGANIZED HEALTH CARE EDUCATION/TRAINING PROGRAM

## 2025-02-07 PROCEDURE — 71046 X-RAY EXAM CHEST 2 VIEWS: CPT

## 2025-02-07 PROCEDURE — 84484 ASSAY OF TROPONIN QUANT: CPT

## 2025-02-07 PROCEDURE — 96375 TX/PRO/DX INJ NEW DRUG ADDON: CPT

## 2025-02-07 PROCEDURE — 87635 SARS-COV-2 COVID-19 AMP PRB: CPT

## 2025-02-07 PROCEDURE — 99285 EMERGENCY DEPT VISIT HI MDM: CPT

## 2025-02-07 PROCEDURE — 2500000003 HC RX 250 WO HCPCS: Performed by: STUDENT IN AN ORGANIZED HEALTH CARE EDUCATION/TRAINING PROGRAM

## 2025-02-07 PROCEDURE — 81001 URINALYSIS AUTO W/SCOPE: CPT

## 2025-02-07 PROCEDURE — 85025 COMPLETE CBC W/AUTO DIFF WBC: CPT

## 2025-02-07 PROCEDURE — 87040 BLOOD CULTURE FOR BACTERIA: CPT

## 2025-02-07 PROCEDURE — 93010 ELECTROCARDIOGRAM REPORT: CPT | Performed by: INTERNAL MEDICINE

## 2025-02-07 PROCEDURE — 83880 ASSAY OF NATRIURETIC PEPTIDE: CPT

## 2025-02-07 PROCEDURE — 80053 COMPREHEN METABOLIC PANEL: CPT

## 2025-02-07 PROCEDURE — 96365 THER/PROPH/DIAG IV INF INIT: CPT

## 2025-02-07 PROCEDURE — 82962 GLUCOSE BLOOD TEST: CPT

## 2025-02-07 PROCEDURE — 96366 THER/PROPH/DIAG IV INF ADDON: CPT

## 2025-02-07 RX ORDER — 0.9 % SODIUM CHLORIDE 0.9 %
1000 INTRAVENOUS SOLUTION INTRAVENOUS
Status: DISCONTINUED | OUTPATIENT
Start: 2025-02-07 | End: 2025-02-07

## 2025-02-07 RX ORDER — GUAIFENESIN AND DEXTROMETHORPHAN HYDROBROMIDE 1200; 60 MG/1; MG/1
1 TABLET, EXTENDED RELEASE ORAL 2 TIMES DAILY PRN
Qty: 28 TABLET | Refills: 0 | Status: SHIPPED | OUTPATIENT
Start: 2025-02-07

## 2025-02-07 RX ORDER — KETOROLAC TROMETHAMINE 15 MG/ML
15 INJECTION, SOLUTION INTRAMUSCULAR; INTRAVENOUS ONCE
Status: COMPLETED | OUTPATIENT
Start: 2025-02-07 | End: 2025-02-07

## 2025-02-07 RX ORDER — AZITHROMYCIN 250 MG/1
TABLET, FILM COATED ORAL
Qty: 6 TABLET | Refills: 0 | Status: SHIPPED | OUTPATIENT
Start: 2025-02-07 | End: 2025-02-17

## 2025-02-07 RX ORDER — ALBUTEROL SULFATE 90 UG/1
2 INHALANT RESPIRATORY (INHALATION) EVERY 6 HOURS PRN
Qty: 1 EACH | Refills: 8 | Status: SHIPPED | OUTPATIENT
Start: 2025-02-07

## 2025-02-07 RX ORDER — DOXYCYCLINE HYCLATE 100 MG
100 TABLET ORAL 2 TIMES DAILY
Qty: 14 TABLET | Refills: 0 | Status: SHIPPED | OUTPATIENT
Start: 2025-02-07 | End: 2025-02-14

## 2025-02-07 RX ORDER — 0.9 % SODIUM CHLORIDE 0.9 %
500 INTRAVENOUS SOLUTION INTRAVENOUS
Status: COMPLETED | OUTPATIENT
Start: 2025-02-07 | End: 2025-02-07

## 2025-02-07 RX ADMIN — KETOROLAC TROMETHAMINE 15 MG: 15 INJECTION, SOLUTION INTRAMUSCULAR; INTRAVENOUS at 17:29

## 2025-02-07 RX ADMIN — AZITHROMYCIN MONOHYDRATE 500 MG: 500 INJECTION, POWDER, LYOPHILIZED, FOR SOLUTION INTRAVENOUS at 17:30

## 2025-02-07 RX ADMIN — CEFTRIAXONE 1000 MG: 1 INJECTION, POWDER, FOR SOLUTION INTRAMUSCULAR; INTRAVENOUS at 17:29

## 2025-02-07 RX ADMIN — SODIUM CHLORIDE 500 ML: 9 INJECTION, SOLUTION INTRAVENOUS at 17:29

## 2025-02-07 ASSESSMENT — PAIN - FUNCTIONAL ASSESSMENT: PAIN_FUNCTIONAL_ASSESSMENT: 0-10

## 2025-02-07 ASSESSMENT — LIFESTYLE VARIABLES
HOW OFTEN DO YOU HAVE A DRINK CONTAINING ALCOHOL: NEVER
HOW MANY STANDARD DRINKS CONTAINING ALCOHOL DO YOU HAVE ON A TYPICAL DAY: PATIENT DOES NOT DRINK

## 2025-02-07 ASSESSMENT — PAIN SCALES - GENERAL
PAINLEVEL_OUTOF10: 3
PAINLEVEL_OUTOF10: 6

## 2025-02-07 NOTE — ED TRIAGE NOTES
Pt coming in for cough, congestion, lack of concentration, feeling bad, and chest discomfort for last week the patient denies any drug use. Per EMS family states crack use. Pt alert and with signs of impairment in triage. Pt states she is not a diabetic but BGL of 330 with EMS

## 2025-02-07 NOTE — ED PROVIDER NOTES
(1/21/2025)    Hunger Vital Sign     Worried About Running Out of Food in the Last Year: Sometimes true     Ran Out of Food in the Last Year: Sometimes true   Transportation Needs: Unmet Transportation Needs (1/21/2025)    PRAPARE - Transportation     Lack of Transportation (Medical): Yes     Lack of Transportation (Non-Medical): Yes   Physical Activity: Inactive (1/21/2025)    Exercise Vital Sign     Days of Exercise per Week: 0 days     Minutes of Exercise per Session: 0 min   Social Connections: Unknown (3/20/2021)    Received from [facility]    Social Connections     Frequency of Communication with Friends and Family: Not asked     Frequency of Social Gatherings with Friends and Family: Not asked   Intimate Partner Violence: Unknown (3/20/2021)    Received from [facility]    Intimate Partner Violence     Fear of Current or Ex-Partner: Not asked     Emotionally Abused: Not asked     Physically Abused: Not asked     Sexually Abused: Not asked   Housing Stability: Low Risk  (1/21/2025)    Housing Stability Vital Sign     Unable to Pay for Housing in the Last Year: No     Number of Times Moved in the Last Year: 0     Homeless in the Last Year: No        Previous Medications    ALBUTEROL SULFATE HFA (PROVENTIL;VENTOLIN;PROAIR) 108 (90 BASE) MCG/ACT INHALER    Inhale 2 puffs into the lungs every 4 hours as needed for Wheezing    ALCOHOL SWABS (DROPSAFE ALCOHOL PREP) 70 % PADS    USE EVERY DAY AND AS NEEDED AS DIRECTED    ASPIRIN 81 MG EC TABLET    Take 1 tablet by mouth daily    ATORVASTATIN (LIPITOR) 80 MG TABLET    Take 1 tablet by mouth daily    AZELASTINE (ASTELIN) 0.1 % NASAL SPRAY    1 spray by Nasal route 2 times daily Use in each nostril as directed    BLOOD GLUCOSE CALIBRATION (TRUE METRIX LEVEL 1) LOW SOLN    1 Device by In Vitro route daily    BLOOD GLUCOSE MONITORING SUPPL (TRUE METRIX AIR GLUCOSE METER) REINALDO    1 Device by Does not apply route daily    BLOOD GLUCOSE  TEST STRIPS (TRUE METRIX BLOOD GLUCOSE TEST) STRIP    TEST BLOOD SUGAR ONE TIME A DAY AND AS NEEDED FOR SYMPTOMS OF IRREGULAR BLOOD GLUCOSE    CARVEDILOL (COREG) 6.25 MG TABLET    Take 1 tablet by mouth 2 times daily (with meals)    CETIRIZINE (ZYRTEC) 10 MG TABLET    Take 1 tablet by mouth daily    CLOPIDOGREL (PLAVIX) 75 MG TABLET    Take 1 tablet by mouth daily    FLUTICASONE (FLONASE) 50 MCG/ACT NASAL SPRAY    1 spray by Each Nostril route daily    ISOSORBIDE MONONITRATE (IMDUR) 30 MG EXTENDED RELEASE TABLET    Take 1 tablet by mouth daily    LISINOPRIL (PRINIVIL;ZESTRIL) 2.5 MG TABLET    Take 1 tablet by mouth daily    METFORMIN (GLUCOPHAGE) 500 MG TABLET    Take 1 tablet by mouth 2 times daily (with meals)    NITROGLYCERIN (NITROSTAT) 0.4 MG SL TABLET    Place 1 tablet under the tongue every 5 minutes as needed for Chest pain    OXYBUTYNIN (DITROPAN XL) 5 MG EXTENDED RELEASE TABLET    Take 1 tablet by mouth daily    ROPINIROLE (REQUIP) 0.25 MG TABLET    Take 1 tablet by mouth nightly For restless legs    TRUEPLUS LANCETS 33G MISC    TEST BLOOD SUGAR ONE TIME A DAY AND AS NEEDED FOR SYMPTOMS OF IRREGULAR BLOOD GLUCOSE    VARENICLINE (CHANTIX CONTINUING MONTH ALESIA) 1 MG TABLET    Take 1 tablet by mouth 2 times daily    VARENICLINE (CHANTIX) 0.5 MG TABLET    Take 1-2 tablets by mouth See Admin Instructions 0.5mg DAILY for 3 days followed by 0.5mg TWICE DAILY for 4 days followed by 1mg TWICE DAILY        Results from this emergency department visit:      Results for orders placed or performed during the hospital encounter of 02/07/25   COVID-19, Rapid    Specimen: Nasopharyngeal   Result Value Ref Range    Source Nasopharyngeal      SARS-CoV-2, Rapid Not detected NOTD     Influenza A/B, Molecular    Specimen: Nasopharyngeal   Result Value Ref Range    Influenza A, ELIZABETH Not detected NOTD      Influenza B, ELIZABETH Not detected NOTD     XR CHEST (2 VW)    Narrative    Chest X-ray    INDICATION: cough, fever    COMPARISON:

## 2025-02-08 NOTE — DISCHARGE INSTRUCTIONS
Take the medication prescribed as directed.  Please arrange follow-up with your primary care provider within 1 week for recheck.  Return immediately for worsening symptoms, concerns or questions.

## 2025-02-09 LAB
BACTERIA SPEC CULT: NORMAL
BACTERIA SPEC CULT: NORMAL
SERVICE CMNT-IMP: NORMAL
SERVICE CMNT-IMP: NORMAL

## 2025-02-10 ENCOUNTER — CARE COORDINATION (OUTPATIENT)
Dept: CARE COORDINATION | Facility: CLINIC | Age: 70
End: 2025-02-10

## 2025-02-10 NOTE — CARE COORDINATION
Ambulatory Care Coordination Note     2/10/2025 9:53 AM     Patient Current Location:  [place]     This patient was received as a referral from Ambulatory Care Manager .    ACM contacted the patient by telephone. Verified name and  with patient as identifiers. Provided introduction to self, and explanation of the ACM role.   Patient declined care management services at this time.

## 2025-02-20 PROBLEM — Z00.00 MEDICARE ANNUAL WELLNESS VISIT, SUBSEQUENT: Status: RESOLVED | Noted: 2025-01-21 | Resolved: 2025-02-20

## 2025-07-21 ENCOUNTER — OFFICE VISIT (OUTPATIENT)
Dept: FAMILY MEDICINE CLINIC | Facility: CLINIC | Age: 70
End: 2025-07-21
Payer: MEDICARE

## 2025-07-21 VITALS
HEIGHT: 69 IN | WEIGHT: 90 LBS | SYSTOLIC BLOOD PRESSURE: 124 MMHG | BODY MASS INDEX: 13.33 KG/M2 | DIASTOLIC BLOOD PRESSURE: 70 MMHG

## 2025-07-21 DIAGNOSIS — Z87.891 PERSONAL HISTORY OF TOBACCO USE: ICD-10-CM

## 2025-07-21 DIAGNOSIS — I50.22 CHRONIC SYSTOLIC CONGESTIVE HEART FAILURE (HCC): ICD-10-CM

## 2025-07-21 DIAGNOSIS — R63.4 ABNORMAL WEIGHT LOSS: ICD-10-CM

## 2025-07-21 DIAGNOSIS — F17.200 SMOKER: ICD-10-CM

## 2025-07-21 DIAGNOSIS — N28.9 ABNORMAL RENAL FUNCTION: ICD-10-CM

## 2025-07-21 DIAGNOSIS — E11.65 TYPE 2 DIABETES MELLITUS WITH HYPERGLYCEMIA, WITHOUT LONG-TERM CURRENT USE OF INSULIN (HCC): Primary | ICD-10-CM

## 2025-07-21 DIAGNOSIS — I25.10 CORONARY ARTERY DISEASE INVOLVING NATIVE CORONARY ARTERY OF NATIVE HEART WITHOUT ANGINA PECTORIS: ICD-10-CM

## 2025-07-21 LAB
ALBUMIN SERPL-MCNC: 3.2 G/DL (ref 3.2–4.6)
ALBUMIN/GLOB SERPL: 0.9 (ref 1–1.9)
ALP SERPL-CCNC: 92 U/L (ref 35–104)
ALT SERPL-CCNC: 36 U/L (ref 8–45)
ANION GAP SERPL CALC-SCNC: 11 MMOL/L (ref 7–16)
AST SERPL-CCNC: 32 U/L (ref 15–37)
BASOPHILS # BLD: 0.02 K/UL (ref 0–0.2)
BASOPHILS NFR BLD: 0.3 % (ref 0–2)
BILIRUB SERPL-MCNC: 0.6 MG/DL (ref 0–1.2)
BUN SERPL-MCNC: 13 MG/DL (ref 8–23)
CALCIUM SERPL-MCNC: 9.1 MG/DL (ref 8.8–10.2)
CHLORIDE SERPL-SCNC: 105 MMOL/L (ref 98–107)
CO2 SERPL-SCNC: 26 MMOL/L (ref 20–29)
CREAT SERPL-MCNC: 0.86 MG/DL (ref 0.6–1.1)
DIFFERENTIAL METHOD BLD: ABNORMAL
EOSINOPHIL # BLD: 0.03 K/UL (ref 0–0.8)
EOSINOPHIL NFR BLD: 0.4 % (ref 0.5–7.8)
ERYTHROCYTE [DISTWIDTH] IN BLOOD BY AUTOMATED COUNT: 14.3 % (ref 11.9–14.6)
EST. AVERAGE GLUCOSE BLD GHB EST-MCNC: 180 MG/DL
GLOBULIN SER CALC-MCNC: 3.7 G/DL (ref 2.3–3.5)
GLUCOSE SERPL-MCNC: 200 MG/DL (ref 70–99)
HBA1C MFR BLD: 7.9 % (ref 0–5.6)
HCT VFR BLD AUTO: 39.6 % (ref 35.8–46.3)
HGB BLD-MCNC: 12.3 G/DL (ref 11.7–15.4)
IMM GRANULOCYTES # BLD AUTO: 0.01 K/UL (ref 0–0.5)
IMM GRANULOCYTES NFR BLD AUTO: 0.1 % (ref 0–5)
LYMPHOCYTES # BLD: 2.3 K/UL (ref 0.5–4.6)
LYMPHOCYTES NFR BLD: 32.2 % (ref 13–44)
MCH RBC QN AUTO: 27.3 PG (ref 26.1–32.9)
MCHC RBC AUTO-ENTMCNC: 31.1 G/DL (ref 31.4–35)
MCV RBC AUTO: 88 FL (ref 82–102)
MONOCYTES # BLD: 0.66 K/UL (ref 0.1–1.3)
MONOCYTES NFR BLD: 9.2 % (ref 4–12)
NEUTS SEG # BLD: 4.13 K/UL (ref 1.7–8.2)
NEUTS SEG NFR BLD: 57.8 % (ref 43–78)
NRBC # BLD: 0 K/UL (ref 0–0.2)
PLATELET # BLD AUTO: 216 K/UL (ref 150–450)
PMV BLD AUTO: 11 FL (ref 9.4–12.3)
POTASSIUM SERPL-SCNC: 3.5 MMOL/L (ref 3.5–5.1)
PROT SERPL-MCNC: 6.8 G/DL (ref 6.3–8.2)
RBC # BLD AUTO: 4.5 M/UL (ref 4.05–5.2)
SODIUM SERPL-SCNC: 143 MMOL/L (ref 136–145)
TSH, 3RD GENERATION: 0.69 UIU/ML (ref 0.27–4.2)
WBC # BLD AUTO: 7.2 K/UL (ref 4.3–11.1)

## 2025-07-21 PROCEDURE — 99214 OFFICE O/P EST MOD 30 MIN: CPT | Performed by: FAMILY MEDICINE

## 2025-07-21 PROCEDURE — 1124F ACP DISCUSS-NO DSCNMKR DOCD: CPT | Performed by: FAMILY MEDICINE

## 2025-07-21 PROCEDURE — G0296 VISIT TO DETERM LDCT ELIG: HCPCS | Performed by: FAMILY MEDICINE

## 2025-07-21 PROCEDURE — 3051F HG A1C>EQUAL 7.0%<8.0%: CPT | Performed by: FAMILY MEDICINE

## 2025-07-21 PROCEDURE — G8427 DOCREV CUR MEDS BY ELIG CLIN: HCPCS | Performed by: FAMILY MEDICINE

## 2025-07-21 PROCEDURE — 1090F PRES/ABSN URINE INCON ASSESS: CPT | Performed by: FAMILY MEDICINE

## 2025-07-21 PROCEDURE — 4004F PT TOBACCO SCREEN RCVD TLK: CPT | Performed by: FAMILY MEDICINE

## 2025-07-21 PROCEDURE — 2022F DILAT RTA XM EVC RTNOPTHY: CPT | Performed by: FAMILY MEDICINE

## 2025-07-21 PROCEDURE — G8400 PT W/DXA NO RESULTS DOC: HCPCS | Performed by: FAMILY MEDICINE

## 2025-07-21 PROCEDURE — 1159F MED LIST DOCD IN RCRD: CPT | Performed by: FAMILY MEDICINE

## 2025-07-21 PROCEDURE — 3017F COLORECTAL CA SCREEN DOC REV: CPT | Performed by: FAMILY MEDICINE

## 2025-07-21 PROCEDURE — G8419 CALC BMI OUT NRM PARAM NOF/U: HCPCS | Performed by: FAMILY MEDICINE

## 2025-07-21 PROCEDURE — 1160F RVW MEDS BY RX/DR IN RCRD: CPT | Performed by: FAMILY MEDICINE

## 2025-07-21 RX ORDER — LOSARTAN POTASSIUM 25 MG/1
12.5 TABLET ORAL DAILY
Qty: 45 TABLET | Refills: 2 | Status: SHIPPED | OUTPATIENT
Start: 2025-07-21

## 2025-07-21 ASSESSMENT — ENCOUNTER SYMPTOMS
SORE THROAT: 0
SINUS PAIN: 0
EYE ITCHING: 0
NAUSEA: 0
SINUS PRESSURE: 0
EYE REDNESS: 0
RHINORRHEA: 0
VOMITING: 0
COLOR CHANGE: 0
EYE DISCHARGE: 0
BLOOD IN STOOL: 0
STRIDOR: 0
DIARRHEA: 0
COUGH: 1
CONSTIPATION: 0
WHEEZING: 0
FACIAL SWELLING: 0
ABDOMINAL PAIN: 0
CHEST TIGHTNESS: 0
BACK PAIN: 0
EYE PAIN: 0
ABDOMINAL DISTENTION: 0
SHORTNESS OF BREATH: 0

## 2025-07-21 ASSESSMENT — PATIENT HEALTH QUESTIONNAIRE - PHQ9
1. LITTLE INTEREST OR PLEASURE IN DOING THINGS: NOT AT ALL
SUM OF ALL RESPONSES TO PHQ QUESTIONS 1-9: 0
2. FEELING DOWN, DEPRESSED OR HOPELESS: NOT AT ALL
SUM OF ALL RESPONSES TO PHQ QUESTIONS 1-9: 0

## 2025-07-21 NOTE — PROGRESS NOTES
[facility]  _______________________________________  Kwasi Madrid MD                 [address] [place] [address]        Ashok Lucas [place] [address]                                                                                    Phone: [phone]                                                                                    Fax: [phone]    Aide Aviles is a 70 y.o. female who is seen for evaluation of   Chief Complaint   Patient presents with    Diabetes    Coronary Artery Disease    Cough     Lingering X several months    Leg Pain     B/L leg pain       HPI:   iAde is a 66 y/o F with h/o CAD, s/p MI with PCI, chronic hep C, t2 diabetes, dyslipidemia, polysubstance abuse, here for f/u.    - c/o persistent cough x several months. Some thick phlegm. Recent pna but pt states cough preceded pna.     - continues to lose weight. Denies any BRBPR. Still smokes. States she is drinking boost 3 times daily.     - restless legs - Requip helping.     - type 2 DM- last A1c < 6.8. Still not checking BG. Denies hypoglycemia s/s.     - CAD- ST elevation MI 12/16/2021. mid LAD with successful PCI and 2 overlapping drug-eluting stents. Had NSTEMI in 2/2023. She underwent LHC on 3/1 with severe small branch vessel disease. Cards felt this was best treated medically. She was positive for cocaine and cannabinoid on urine drug screen. Apparently she had not been compliant with DAPT, statin prior to event.     - dyslipidemia- Last LDL 39 on lipitor. Mostly compliant with diet.     - hep C- Pt states that she was diagnosed at [facility] a number of years ago, but never saw GI. LFTs have been stable.     - still smoking. Never started chantix.     Diabetes  Pertinent negatives for hypoglycemia include no confusion, dizziness, headaches, nervousness/anxiousness, pallor, seizures, speech difficulty or tremors. Pertinent negatives for diabetes include no chest pain, no

## 2025-07-21 NOTE — PATIENT INSTRUCTIONS
follow-up, he or she will help you understand what to do next.  After a lung cancer screening, you can go back to your usual activities right away.  A lung cancer screening test can't tell if you have lung cancer. If your results are positive, your doctor can't tell whether an abnormal finding is a harmless nodule, cancer, or something else without doing more tests.  What can you do to help prevent lung cancer?  Some lung cancers can't be prevented. But if you smoke, quitting smoking is the best step you can take to prevent lung cancer. If you want to quit, your doctor can recommend medicines or other ways to help.  Follow-up care is a key part of your treatment and safety. Be sure to make and go to all appointments, and call your doctor if you are having problems. It's also a good idea to know your test results and keep a list of the medicines you take.  Where can you learn more?  Go to https://www.[employer].net/patientEd and enter Q940 to learn more about \"Learning About Lung Cancer Screening.\"  Current as of: October 25, 2024  Content Version: 14.5  © 8257-2921 [employer].   Care instructions adapted under license by [employer]. If you have questions about a medical condition or this instruction, always ask your healthcare professional. [employer] disclaims any warranty or liability for your use of this information.

## 2025-07-21 NOTE — ASSESSMENT & PLAN NOTE
Check chest CT. Have not been able to convince her to schedule mammo. Reinforced importance of screening, especially given wt loss.

## 2025-07-22 ENCOUNTER — TELEPHONE (OUTPATIENT)
Dept: FAMILY MEDICINE CLINIC | Facility: CLINIC | Age: 70
End: 2025-07-22

## 2025-07-22 DIAGNOSIS — E11.65 TYPE 2 DIABETES MELLITUS WITH HYPERGLYCEMIA, WITHOUT LONG-TERM CURRENT USE OF INSULIN (HCC): Primary | ICD-10-CM

## (undated) DEVICE — KENDALL RADIOLUCENT FOAM MONITORING ELECTRODE RECTANGULAR SHAPE: Brand: KENDALL

## (undated) DEVICE — CANNULA NSL ORAL AD FOR CAPNOFLEX CO2 O2 AIRLFE

## (undated) DEVICE — SYRINGE, LUER SLIP, STERILE, 60ML: Brand: MEDLINE

## (undated) DEVICE — ELECTRODE PT RET AD L9FT HI MOIST COND ADH HYDRGEL CORDED

## (undated) DEVICE — SYRINGE MED 10ML LUERLOCK TIP W/O SFTY DISP

## (undated) DEVICE — AIRLIFE™ OXYGEN TUBING 7 FEET (2.1 M) CRUSH RESISTANT OXYGEN TUBING, VINYL TIPPED: Brand: AIRLIFE™

## (undated) DEVICE — CONTAINER FORMALIN PREFILLED 10% NBF 60ML

## (undated) DEVICE — LUBE JELLY FOIL PACK 1.4 OZ: Brand: MEDLINE INDUSTRIES, INC.

## (undated) DEVICE — SNARE POLYP SM W13MMXL240CM SHTH DIA2.4MM OVL FLX DISP

## (undated) DEVICE — SYRINGE MED 3ML CLR PLAS STD N CTRL LUERLOCK TIP DISP

## (undated) DEVICE — FORCEPS BX L L240CM DIA2.4MM RAD JAW 4 HOT FOR POLYP DISP

## (undated) DEVICE — NEEDLE SYR 18GA L1.5IN RED PLAS HUB S STL BLNT FILL W/O

## (undated) DEVICE — CONNECTOR TBNG OD5-7MM O2 END DISP

## (undated) DEVICE — TRAP SPEC POLYP REM STRNR CLN DSGN MAGNIFYING WIND DISP

## (undated) DEVICE — GAUZE,SPONGE,4"X4",12PLY,WOVEN,NS,LF: Brand: MEDLINE

## (undated) DEVICE — YANKAUER,BULB TIP,W/O VENT,RIGID,STERILE: Brand: MEDLINE

## (undated) DEVICE — ENDOSCOPIC KIT 1.1+ OP4 CA DE 2 GWN AAMI LEVEL 3

## (undated) DEVICE — SINGLE PORT MANIFOLD: Brand: NEPTUNE 2